# Patient Record
Sex: FEMALE | Race: OTHER | HISPANIC OR LATINO
[De-identification: names, ages, dates, MRNs, and addresses within clinical notes are randomized per-mention and may not be internally consistent; named-entity substitution may affect disease eponyms.]

---

## 2021-12-15 PROBLEM — Z00.00 ENCOUNTER FOR PREVENTIVE HEALTH EXAMINATION: Status: ACTIVE | Noted: 2021-12-15

## 2021-12-16 ENCOUNTER — RESULT REVIEW (OUTPATIENT)
Age: 56
End: 2021-12-16

## 2021-12-16 ENCOUNTER — APPOINTMENT (OUTPATIENT)
Dept: SURGICAL ONCOLOGY | Facility: CLINIC | Age: 56
End: 2021-12-16
Payer: COMMERCIAL

## 2021-12-16 PROCEDURE — 19083 BX BREAST 1ST LESION US IMAG: CPT

## 2021-12-16 PROCEDURE — 99205 OFFICE O/P NEW HI 60 MIN: CPT | Mod: 25

## 2022-03-14 ENCOUNTER — NON-APPOINTMENT (OUTPATIENT)
Age: 57
End: 2022-03-14

## 2022-03-22 ENCOUNTER — APPOINTMENT (OUTPATIENT)
Dept: SURGICAL ONCOLOGY | Facility: AMBULATORY MEDICAL SERVICES | Age: 57
End: 2022-03-22
Payer: COMMERCIAL

## 2022-03-22 PROCEDURE — 14000 TIS TRNFR TRUNK 10 SQ CM/<: CPT

## 2022-03-22 PROCEDURE — 19301 PARTIAL MASTECTOMY: CPT

## 2022-04-14 ENCOUNTER — APPOINTMENT (OUTPATIENT)
Dept: SURGICAL ONCOLOGY | Facility: CLINIC | Age: 57
End: 2022-04-14
Payer: COMMERCIAL

## 2022-04-14 PROCEDURE — 99024 POSTOP FOLLOW-UP VISIT: CPT

## 2022-12-15 ENCOUNTER — APPOINTMENT (OUTPATIENT)
Dept: SURGICAL ONCOLOGY | Facility: CLINIC | Age: 57
End: 2022-12-15

## 2023-09-25 ENCOUNTER — INPATIENT (INPATIENT)
Facility: HOSPITAL | Age: 58
LOS: 6 days | Discharge: HOME | DRG: 720 | End: 2023-10-02
Attending: STUDENT IN AN ORGANIZED HEALTH CARE EDUCATION/TRAINING PROGRAM | Admitting: INTERNAL MEDICINE
Payer: MEDICAID

## 2023-09-25 VITALS
OXYGEN SATURATION: 99 % | SYSTOLIC BLOOD PRESSURE: 138 MMHG | WEIGHT: 119.93 LBS | DIASTOLIC BLOOD PRESSURE: 70 MMHG | HEART RATE: 118 BPM | TEMPERATURE: 101 F | RESPIRATION RATE: 18 BRPM

## 2023-09-25 DIAGNOSIS — N12 TUBULO-INTERSTITIAL NEPHRITIS, NOT SPECIFIED AS ACUTE OR CHRONIC: ICD-10-CM

## 2023-09-25 LAB
ALBUMIN SERPL ELPH-MCNC: 4 G/DL — SIGNIFICANT CHANGE UP (ref 3.5–5.2)
ALP SERPL-CCNC: 104 U/L — SIGNIFICANT CHANGE UP (ref 30–115)
ALT FLD-CCNC: 62 U/L — HIGH (ref 0–41)
ANION GAP SERPL CALC-SCNC: 15 MMOL/L — HIGH (ref 7–14)
APPEARANCE UR: ABNORMAL
APTT BLD: 22.5 SEC — CRITICAL LOW (ref 27–39.2)
AST SERPL-CCNC: 37 U/L — SIGNIFICANT CHANGE UP (ref 0–41)
BASOPHILS # BLD AUTO: 0.03 K/UL — SIGNIFICANT CHANGE UP (ref 0–0.2)
BASOPHILS NFR BLD AUTO: 0.2 % — SIGNIFICANT CHANGE UP (ref 0–1)
BILIRUB SERPL-MCNC: 0.3 MG/DL — SIGNIFICANT CHANGE UP (ref 0.2–1.2)
BILIRUB UR-MCNC: NEGATIVE — SIGNIFICANT CHANGE UP
BUN SERPL-MCNC: 6 MG/DL — LOW (ref 10–20)
CALCIUM SERPL-MCNC: 9.3 MG/DL — SIGNIFICANT CHANGE UP (ref 8.4–10.5)
CHLORIDE SERPL-SCNC: 93 MMOL/L — LOW (ref 98–110)
CO2 SERPL-SCNC: 24 MMOL/L — SIGNIFICANT CHANGE UP (ref 17–32)
COLOR SPEC: SIGNIFICANT CHANGE UP
CREAT SERPL-MCNC: 0.6 MG/DL — LOW (ref 0.7–1.5)
DIFF PNL FLD: ABNORMAL
EGFR: 105 ML/MIN/1.73M2 — SIGNIFICANT CHANGE UP
EOSINOPHIL # BLD AUTO: 0 K/UL — SIGNIFICANT CHANGE UP (ref 0–0.7)
EOSINOPHIL NFR BLD AUTO: 0 % — SIGNIFICANT CHANGE UP (ref 0–8)
GLUCOSE SERPL-MCNC: 103 MG/DL — HIGH (ref 70–99)
GLUCOSE UR QL: NEGATIVE MG/DL — SIGNIFICANT CHANGE UP
HCT VFR BLD CALC: 39.7 % — SIGNIFICANT CHANGE UP (ref 37–47)
HGB BLD-MCNC: 13.9 G/DL — SIGNIFICANT CHANGE UP (ref 12–16)
IMM GRANULOCYTES NFR BLD AUTO: 0.3 % — SIGNIFICANT CHANGE UP (ref 0.1–0.3)
INR BLD: 1.13 RATIO — SIGNIFICANT CHANGE UP (ref 0.65–1.3)
KETONES UR-MCNC: ABNORMAL MG/DL
LEUKOCYTE ESTERASE UR-ACNC: ABNORMAL
LIDOCAIN IGE QN: 22 U/L — SIGNIFICANT CHANGE UP (ref 7–60)
LYMPHOCYTES # BLD AUTO: 1.45 K/UL — SIGNIFICANT CHANGE UP (ref 1.2–3.4)
LYMPHOCYTES # BLD AUTO: 11.8 % — LOW (ref 20.5–51.1)
MCHC RBC-ENTMCNC: 30.3 PG — SIGNIFICANT CHANGE UP (ref 27–31)
MCHC RBC-ENTMCNC: 35 G/DL — SIGNIFICANT CHANGE UP (ref 32–37)
MCV RBC AUTO: 86.5 FL — SIGNIFICANT CHANGE UP (ref 81–99)
MONOCYTES # BLD AUTO: 1.65 K/UL — HIGH (ref 0.1–0.6)
MONOCYTES NFR BLD AUTO: 13.4 % — HIGH (ref 1.7–9.3)
NEUTROPHILS # BLD AUTO: 9.17 K/UL — HIGH (ref 1.4–6.5)
NEUTROPHILS NFR BLD AUTO: 74.3 % — SIGNIFICANT CHANGE UP (ref 42.2–75.2)
NITRITE UR-MCNC: POSITIVE
NRBC # BLD: 0 /100 WBCS — SIGNIFICANT CHANGE UP (ref 0–0)
PH UR: 6 — SIGNIFICANT CHANGE UP (ref 5–8)
PLATELET # BLD AUTO: 261 K/UL — SIGNIFICANT CHANGE UP (ref 130–400)
PMV BLD: 12.2 FL — HIGH (ref 7.4–10.4)
POTASSIUM SERPL-MCNC: 3.8 MMOL/L — SIGNIFICANT CHANGE UP (ref 3.5–5)
POTASSIUM SERPL-SCNC: 3.8 MMOL/L — SIGNIFICANT CHANGE UP (ref 3.5–5)
PROT SERPL-MCNC: 6.9 G/DL — SIGNIFICANT CHANGE UP (ref 6–8)
PROT UR-MCNC: 100 MG/DL
PROTHROM AB SERPL-ACNC: 12.9 SEC — HIGH (ref 9.95–12.87)
RBC # BLD: 4.59 M/UL — SIGNIFICANT CHANGE UP (ref 4.2–5.4)
RBC # FLD: 12.5 % — SIGNIFICANT CHANGE UP (ref 11.5–14.5)
SODIUM SERPL-SCNC: 132 MMOL/L — LOW (ref 135–146)
SP GR SPEC: 1.02 — SIGNIFICANT CHANGE UP (ref 1–1.03)
UROBILINOGEN FLD QL: 1 MG/DL — SIGNIFICANT CHANGE UP (ref 0.2–1)
WBC # BLD: 12.34 K/UL — HIGH (ref 4.8–10.8)
WBC # FLD AUTO: 12.34 K/UL — HIGH (ref 4.8–10.8)

## 2023-09-25 PROCEDURE — 93005 ELECTROCARDIOGRAM TRACING: CPT

## 2023-09-25 PROCEDURE — 83735 ASSAY OF MAGNESIUM: CPT

## 2023-09-25 PROCEDURE — 82962 GLUCOSE BLOOD TEST: CPT

## 2023-09-25 PROCEDURE — 82550 ASSAY OF CK (CPK): CPT

## 2023-09-25 PROCEDURE — 80053 COMPREHEN METABOLIC PANEL: CPT

## 2023-09-25 PROCEDURE — 83605 ASSAY OF LACTIC ACID: CPT

## 2023-09-25 PROCEDURE — 74177 CT ABD & PELVIS W/CONTRAST: CPT | Mod: 26,MA

## 2023-09-25 PROCEDURE — 36415 COLL VENOUS BLD VENIPUNCTURE: CPT

## 2023-09-25 PROCEDURE — 80048 BASIC METABOLIC PNL TOTAL CA: CPT

## 2023-09-25 PROCEDURE — 76770 US EXAM ABDO BACK WALL COMP: CPT

## 2023-09-25 PROCEDURE — 82553 CREATINE MB FRACTION: CPT

## 2023-09-25 PROCEDURE — 99285 EMERGENCY DEPT VISIT HI MDM: CPT

## 2023-09-25 PROCEDURE — 85025 COMPLETE CBC W/AUTO DIFF WBC: CPT

## 2023-09-25 PROCEDURE — 74177 CT ABD & PELVIS W/CONTRAST: CPT

## 2023-09-25 PROCEDURE — 99221 1ST HOSP IP/OBS SF/LOW 40: CPT

## 2023-09-25 PROCEDURE — 84484 ASSAY OF TROPONIN QUANT: CPT

## 2023-09-25 RX ORDER — SODIUM CHLORIDE 9 MG/ML
1000 INJECTION, SOLUTION INTRAVENOUS ONCE
Refills: 0 | Status: COMPLETED | OUTPATIENT
Start: 2023-09-25 | End: 2023-09-25

## 2023-09-25 RX ORDER — ONDANSETRON 8 MG/1
4 TABLET, FILM COATED ORAL EVERY 8 HOURS
Refills: 0 | Status: DISCONTINUED | OUTPATIENT
Start: 2023-09-25 | End: 2023-10-02

## 2023-09-25 RX ORDER — KETOROLAC TROMETHAMINE 30 MG/ML
30 SYRINGE (ML) INJECTION ONCE
Refills: 0 | Status: DISCONTINUED | OUTPATIENT
Start: 2023-09-25 | End: 2023-09-25

## 2023-09-25 RX ORDER — SODIUM CHLORIDE 9 MG/ML
500 INJECTION, SOLUTION INTRAVENOUS ONCE
Refills: 0 | Status: COMPLETED | OUTPATIENT
Start: 2023-09-25 | End: 2023-09-25

## 2023-09-25 RX ORDER — ZOLPIDEM TARTRATE 10 MG/1
1 TABLET ORAL
Refills: 0 | DISCHARGE

## 2023-09-25 RX ORDER — CLONAZEPAM 1 MG
1 TABLET ORAL
Refills: 0 | DISCHARGE

## 2023-09-25 RX ORDER — ZOLPIDEM TARTRATE 10 MG/1
5 TABLET ORAL AT BEDTIME
Refills: 0 | Status: DISCONTINUED | OUTPATIENT
Start: 2023-09-25 | End: 2023-10-01

## 2023-09-25 RX ORDER — CEFTRIAXONE 500 MG/1
1000 INJECTION, POWDER, FOR SOLUTION INTRAMUSCULAR; INTRAVENOUS EVERY 24 HOURS
Refills: 0 | Status: DISCONTINUED | OUTPATIENT
Start: 2023-09-25 | End: 2023-09-26

## 2023-09-25 RX ORDER — ACETAMINOPHEN 500 MG
650 TABLET ORAL EVERY 6 HOURS
Refills: 0 | Status: DISCONTINUED | OUTPATIENT
Start: 2023-09-25 | End: 2023-10-02

## 2023-09-25 RX ORDER — CLONAZEPAM 1 MG
0.5 TABLET ORAL DAILY
Refills: 0 | Status: DISCONTINUED | OUTPATIENT
Start: 2023-09-25 | End: 2023-09-29

## 2023-09-25 RX ORDER — BUPROPION HYDROCHLORIDE 150 MG/1
1 TABLET, EXTENDED RELEASE ORAL
Refills: 0 | DISCHARGE

## 2023-09-25 RX ORDER — CEFTRIAXONE 500 MG/1
1000 INJECTION, POWDER, FOR SOLUTION INTRAMUSCULAR; INTRAVENOUS ONCE
Refills: 0 | Status: COMPLETED | OUTPATIENT
Start: 2023-09-25 | End: 2023-09-25

## 2023-09-25 RX ORDER — SODIUM CHLORIDE 9 MG/ML
1000 INJECTION INTRAMUSCULAR; INTRAVENOUS; SUBCUTANEOUS
Refills: 0 | Status: DISCONTINUED | OUTPATIENT
Start: 2023-09-25 | End: 2023-10-02

## 2023-09-25 RX ORDER — ONDANSETRON 8 MG/1
4 TABLET, FILM COATED ORAL ONCE
Refills: 0 | Status: COMPLETED | OUTPATIENT
Start: 2023-09-25 | End: 2023-09-25

## 2023-09-25 RX ORDER — MORPHINE SULFATE 50 MG/1
4 CAPSULE, EXTENDED RELEASE ORAL ONCE
Refills: 0 | Status: DISCONTINUED | OUTPATIENT
Start: 2023-09-25 | End: 2023-09-25

## 2023-09-25 RX ORDER — LAMOTRIGINE 25 MG/1
1 TABLET, ORALLY DISINTEGRATING ORAL
Refills: 0 | DISCHARGE

## 2023-09-25 RX ORDER — LANOLIN ALCOHOL/MO/W.PET/CERES
5 CREAM (GRAM) TOPICAL AT BEDTIME
Refills: 0 | Status: DISCONTINUED | OUTPATIENT
Start: 2023-09-25 | End: 2023-10-02

## 2023-09-25 RX ORDER — BUPROPION HYDROCHLORIDE 150 MG/1
150 TABLET, EXTENDED RELEASE ORAL DAILY
Refills: 0 | Status: DISCONTINUED | OUTPATIENT
Start: 2023-09-25 | End: 2023-10-02

## 2023-09-25 RX ORDER — LAMOTRIGINE 25 MG/1
100 TABLET, ORALLY DISINTEGRATING ORAL DAILY
Refills: 0 | Status: DISCONTINUED | OUTPATIENT
Start: 2023-09-25 | End: 2023-09-30

## 2023-09-25 RX ORDER — MORPHINE SULFATE 50 MG/1
4 CAPSULE, EXTENDED RELEASE ORAL EVERY 4 HOURS
Refills: 0 | Status: DISCONTINUED | OUTPATIENT
Start: 2023-09-25 | End: 2023-09-30

## 2023-09-25 RX ADMIN — ONDANSETRON 4 MILLIGRAM(S): 8 TABLET, FILM COATED ORAL at 14:49

## 2023-09-25 RX ADMIN — MORPHINE SULFATE 4 MILLIGRAM(S): 50 CAPSULE, EXTENDED RELEASE ORAL at 16:43

## 2023-09-25 RX ADMIN — CEFTRIAXONE 100 MILLIGRAM(S): 500 INJECTION, POWDER, FOR SOLUTION INTRAMUSCULAR; INTRAVENOUS at 16:43

## 2023-09-25 RX ADMIN — SODIUM CHLORIDE 500 MILLILITER(S): 9 INJECTION, SOLUTION INTRAVENOUS at 16:44

## 2023-09-25 RX ADMIN — Medication 650 MILLIGRAM(S): at 20:33

## 2023-09-25 RX ADMIN — SODIUM CHLORIDE 1000 MILLILITER(S): 9 INJECTION, SOLUTION INTRAVENOUS at 14:48

## 2023-09-25 NOTE — ED ADULT NURSE NOTE - OBJECTIVE STATEMENT
pt complaining of abdominal pain and vomiting for 5 days   "I think its a kidney stone. I have a history of them"

## 2023-09-25 NOTE — ED PROVIDER NOTE - OBJECTIVE STATEMENT
Patient is a 58 year old female with pmhx of pseudoxanthoma Elasticum, blindness presents for evaluation of left sided flank pain, N/V, dysuria, urgency, frequency for 5 days. She admits to history of kidney stones and "kidney infections". She denies any noted fever at home but admits to subjective chills. She denies any cough, sore throat, chest pain, sob, or other complaints at this time.

## 2023-09-25 NOTE — ED ADULT NURSE NOTE - NSFALLUNIVINTERV_ED_ALL_ED
Bed/Stretcher in lowest position, wheels locked, appropriate side rails in place/Call bell, personal items and telephone in reach/Instruct patient to call for assistance before getting out of bed/chair/stretcher/Non-slip footwear applied when patient is off stretcher/[place] to call system/Physically safe environment - no spills, clutter or unnecessary equipment/Purposeful proactive rounding/Room/bathroom lighting operational, light cord in reach

## 2023-09-25 NOTE — ED ADULT NURSE NOTE - NSICDXPASTMEDICALHX_GEN_ALL_CORE_FT
PAST MEDICAL HISTORY:  H/O blindness     PAD (peripheral artery disease)     Pseudoxanthoma elasticum-like papillary dermal elastolysis

## 2023-09-25 NOTE — H&P ADULT - NS ATTEND AMEND GEN_ALL_CORE FT
Seen while in the ER, significant other at bedside - agree with assessment and plan as outlined Seen while in the ER, significant other at bedside - agree with assessment (sepsis on admission - fever, tachycardia, leukocytosis. Also has mild hyponatremia) and plan as outlined Seen while in the ER, significant other at bedside - agree with assessment (sepsis on admission - fever, tachycardia, leukocytosis. Also has mild hyponatremia, mild transaminitis and mild elevated anion gap without acidosis) and plan as outlined

## 2023-09-25 NOTE — PATIENT PROFILE ADULT - FALL HARM RISK - HARM RISK INTERVENTIONS

## 2023-09-25 NOTE — ED PROVIDER NOTE - ATTENDING APP SHARED VISIT CONTRIBUTION OF CARE
58-year-old female presenting today with abdominal pain, diarrhea, flank pain.  Patient has history of kidney stones.  Patient is given medications for UTI.  CT scan is pending and patient is signed out pending CT scan results and disposition

## 2023-09-25 NOTE — ED PROVIDER NOTE - NS ED ATTENDING STATEMENT MOD
This was a shared visit with the JACKLYN. I reviewed and verified the documentation and independently performed the documented:

## 2023-09-25 NOTE — H&P ADULT - HISTORY OF PRESENT ILLNESS
59 y/o 57 y/o female pmhx, PXE, Blindness, presents with c/o lower abdomen pain, radiating across bilateral lower abdomen.  Pt states symptoms started a week ago with Pain, Dysuria, Frequency, malodorous urine, chills and vomiting.  Pt states started taking Tylenol with little improvement.  Pt denies recent UTI treatment.    Home meds obtained from pt.

## 2023-09-25 NOTE — ED PROVIDER NOTE - PHYSICAL EXAMINATION
As Follows:  CONST: Appears in uncomfortable in stretcher.   ENT: No nasal discharge. Oropharynx normal appearing, no erythema or exudates. Uvula midline  CARD: No murmurs, rubs, or gallops; Tachycardic.   RESP: BS Equal B/L, No wheezes, rhonchi or rales. No distress or accessory breathing  GI: Suprapubic tenderness. Soft, non-distended. Left sided CVA tenderness.   SKIN: Warm, dry, no acute rashes. MMM  NEURO: A&Ox3, No focal deficits. Strength and sensation intact. Steady gait

## 2023-09-25 NOTE — H&P ADULT - ASSESSMENT
59 y/o female pmhx, PXE, Blindness, presents with c/o lower abdomen pain, radiating across bilateral lower abdomen.  Pt states symptoms started a week ago with Pain, Dysuria, Frequency, malodorous urine, chills and vomiting.  Pt states started taking Tylenol with little improvement.  Pt denies recent UTI treatment.      # Pyelonephritis  - Nephro consult  - trend labs  - IVF  - rocephin 1gram daily  - pain management    # Blindness  - Full assistance  - RTF     59 y/o female pmhx, PXE, Blindness, presents with c/o lower abdomen pain, radiating across bilateral lower abdomen.  Pt states symptoms started a week ago with Pain, Dysuria, Frequency, malodorous urine, chills and vomiting.  Pt states started taking Tylenol with little improvement.  Pt denies recent UTI treatment.      # Pyelonephritis  - ID consult  - trend labs  - IVF  - rocephin 1gram daily  - pain management    # Blindness  - Full assistance  - RTF

## 2023-09-26 LAB
ALBUMIN SERPL ELPH-MCNC: 3.4 G/DL — LOW (ref 3.5–5.2)
ALP SERPL-CCNC: 94 U/L — SIGNIFICANT CHANGE UP (ref 30–115)
ALT FLD-CCNC: 54 U/L — HIGH (ref 0–41)
ANION GAP SERPL CALC-SCNC: 10 MMOL/L — SIGNIFICANT CHANGE UP (ref 7–14)
AST SERPL-CCNC: 30 U/L — SIGNIFICANT CHANGE UP (ref 0–41)
BASOPHILS # BLD AUTO: 0.05 K/UL — SIGNIFICANT CHANGE UP (ref 0–0.2)
BASOPHILS NFR BLD AUTO: 0.5 % — SIGNIFICANT CHANGE UP (ref 0–1)
BILIRUB SERPL-MCNC: 0.3 MG/DL — SIGNIFICANT CHANGE UP (ref 0.2–1.2)
BUN SERPL-MCNC: 5 MG/DL — LOW (ref 10–20)
CALCIUM SERPL-MCNC: 9 MG/DL — SIGNIFICANT CHANGE UP (ref 8.4–10.5)
CHLORIDE SERPL-SCNC: 99 MMOL/L — SIGNIFICANT CHANGE UP (ref 98–110)
CO2 SERPL-SCNC: 29 MMOL/L — SIGNIFICANT CHANGE UP (ref 17–32)
CREAT SERPL-MCNC: 0.5 MG/DL — LOW (ref 0.7–1.5)
EGFR: 109 ML/MIN/1.73M2 — SIGNIFICANT CHANGE UP
EOSINOPHIL # BLD AUTO: 0.07 K/UL — SIGNIFICANT CHANGE UP (ref 0–0.7)
EOSINOPHIL NFR BLD AUTO: 0.6 % — SIGNIFICANT CHANGE UP (ref 0–8)
GLUCOSE SERPL-MCNC: 95 MG/DL — SIGNIFICANT CHANGE UP (ref 70–99)
HCT VFR BLD CALC: 35.6 % — LOW (ref 37–47)
HGB BLD-MCNC: 12.2 G/DL — SIGNIFICANT CHANGE UP (ref 12–16)
IMM GRANULOCYTES NFR BLD AUTO: 0.4 % — HIGH (ref 0.1–0.3)
LACTATE SERPL-SCNC: 0.8 MMOL/L — SIGNIFICANT CHANGE UP (ref 0.7–2)
LYMPHOCYTES # BLD AUTO: 1.25 K/UL — SIGNIFICANT CHANGE UP (ref 1.2–3.4)
LYMPHOCYTES # BLD AUTO: 11.5 % — LOW (ref 20.5–51.1)
MCHC RBC-ENTMCNC: 29.8 PG — SIGNIFICANT CHANGE UP (ref 27–31)
MCHC RBC-ENTMCNC: 34.3 G/DL — SIGNIFICANT CHANGE UP (ref 32–37)
MCV RBC AUTO: 87 FL — SIGNIFICANT CHANGE UP (ref 81–99)
MONOCYTES # BLD AUTO: 1.46 K/UL — HIGH (ref 0.1–0.6)
MONOCYTES NFR BLD AUTO: 13.5 % — HIGH (ref 1.7–9.3)
NEUTROPHILS # BLD AUTO: 7.96 K/UL — HIGH (ref 1.4–6.5)
NEUTROPHILS NFR BLD AUTO: 73.5 % — SIGNIFICANT CHANGE UP (ref 42.2–75.2)
NRBC # BLD: 0 /100 WBCS — SIGNIFICANT CHANGE UP (ref 0–0)
PLATELET # BLD AUTO: 254 K/UL — SIGNIFICANT CHANGE UP (ref 130–400)
PMV BLD: 12.5 FL — HIGH (ref 7.4–10.4)
POTASSIUM SERPL-MCNC: 3.3 MMOL/L — LOW (ref 3.5–5)
POTASSIUM SERPL-SCNC: 3.3 MMOL/L — LOW (ref 3.5–5)
PROT SERPL-MCNC: 5.9 G/DL — LOW (ref 6–8)
RBC # BLD: 4.09 M/UL — LOW (ref 4.2–5.4)
RBC # FLD: 12.6 % — SIGNIFICANT CHANGE UP (ref 11.5–14.5)
SODIUM SERPL-SCNC: 138 MMOL/L — SIGNIFICANT CHANGE UP (ref 135–146)
WBC # BLD: 10.83 K/UL — HIGH (ref 4.8–10.8)
WBC # FLD AUTO: 10.83 K/UL — HIGH (ref 4.8–10.8)

## 2023-09-26 PROCEDURE — 99233 SBSQ HOSP IP/OBS HIGH 50: CPT

## 2023-09-26 RX ORDER — CEFEPIME 1 G/1
1000 INJECTION, POWDER, FOR SOLUTION INTRAMUSCULAR; INTRAVENOUS EVERY 8 HOURS
Refills: 0 | Status: DISCONTINUED | OUTPATIENT
Start: 2023-09-26 | End: 2023-09-28

## 2023-09-26 RX ORDER — CEFEPIME 1 G/1
1000 INJECTION, POWDER, FOR SOLUTION INTRAMUSCULAR; INTRAVENOUS EVERY 8 HOURS
Refills: 0 | Status: DISCONTINUED | OUTPATIENT
Start: 2023-09-26 | End: 2023-09-27

## 2023-09-26 RX ORDER — POTASSIUM CHLORIDE 20 MEQ
20 PACKET (EA) ORAL
Refills: 0 | Status: COMPLETED | OUTPATIENT
Start: 2023-09-26 | End: 2023-09-26

## 2023-09-26 RX ADMIN — CEFEPIME 100 MILLIGRAM(S): 1 INJECTION, POWDER, FOR SOLUTION INTRAMUSCULAR; INTRAVENOUS at 14:29

## 2023-09-26 RX ADMIN — LAMOTRIGINE 100 MILLIGRAM(S): 25 TABLET, ORALLY DISINTEGRATING ORAL at 22:41

## 2023-09-26 RX ADMIN — SODIUM CHLORIDE 100 MILLILITER(S): 9 INJECTION INTRAMUSCULAR; INTRAVENOUS; SUBCUTANEOUS at 14:29

## 2023-09-26 RX ADMIN — SODIUM CHLORIDE 100 MILLILITER(S): 9 INJECTION INTRAMUSCULAR; INTRAVENOUS; SUBCUTANEOUS at 06:28

## 2023-09-26 RX ADMIN — Medication 20 MILLIEQUIVALENT(S): at 20:40

## 2023-09-26 RX ADMIN — Medication 20 MILLIEQUIVALENT(S): at 18:27

## 2023-09-26 RX ADMIN — Medication 20 MILLIEQUIVALENT(S): at 16:30

## 2023-09-26 RX ADMIN — Medication 650 MILLIGRAM(S): at 12:34

## 2023-09-26 RX ADMIN — ZOLPIDEM TARTRATE 5 MILLIGRAM(S): 10 TABLET ORAL at 22:53

## 2023-09-26 RX ADMIN — Medication 650 MILLIGRAM(S): at 12:04

## 2023-09-26 RX ADMIN — CEFEPIME 100 MILLIGRAM(S): 1 INJECTION, POWDER, FOR SOLUTION INTRAMUSCULAR; INTRAVENOUS at 22:53

## 2023-09-26 NOTE — DIETITIAN INITIAL EVALUATION ADULT - NUTRITIONGOAL OUTCOME1
pt to tolerate po diet and meet at least 50-75% of estimated nutrient needs within 3-5 days lethargic

## 2023-09-26 NOTE — DIETITIAN INITIAL EVALUATION ADULT - OTHER INFO
pt is 58 year old female with hx of PXE, blindness, presents with lower abd pain radiated to B/L abd which began 1 week ago. pt admitted with pyelonephritis and severe sepsis. Hypokalemia 2/2 LBM's noted

## 2023-09-26 NOTE — CONSULT NOTE ADULT - SUBJECTIVE AND OBJECTIVE BOX
RICHY STERLING  58y, Female  Allergies    Toradol (Vomiting)    Intolerances        LOS  1d    HPI  HPI:  59 y/o female pmhx, PXE, Blindness, presents with c/o lower abdomen pain, radiating across bilateral lower abdomen.  Pt states symptoms started a week ago with Pain, Dysuria, Frequency, malodorous urine, chills and vomiting.  Pt states started taking Tylenol with little improvement.  Pt denies recent UTI treatment.    Home meds obtained from pt.   (25 Sep 2023 19:51)      INFECTIOUS DISEASE HISTORY:  ID is being consulted for antimicrobial recommendations.     Prior hospital charts reviewed [Yes]  Primary team notes reviewed [Yes]  Other consultant notes reviewed [Yes]    REVIEW OF SYSTEMS:  CONSTITUTIONAL: No fever or chills  HEENT: No sore throat  RESPIRATORY: No cough, no shortness of breath  CARDIOVASCULAR: No chest pain or palpitations  GASTROINTESTINAL: No abdominal or epigastric pain  GENITOURINARY: No dysuria  NEUROLOGICAL: No headache/dizziness  MSK: No joint pain, erythema, or swelling; no back pain  SKIN: No itching, rashes  All other ROS negative except noted above    PAST MEDICAL & SURGICAL HISTORY:  Pseudoxanthoma elasticum-like papillary dermal elastolysis      H/O blindness      PAD (peripheral artery disease)          SOCIAL HISTORY:  - Born in [place], migrated [place] in 19XX  - Currently working as / Retired  - Lives with _____; no pets  - No recent travel  - Denies tobacco use, alcohol use, illicit drug use  - Currently sexually active, has one male/female sexual partner    FAMILY HISTORY:      Allergy: 1d    ANTIMICROBIALS:  cefTRIAXone   IVPB 1000 every 24 hours      ANTIMICROBIALS (past 90 days):  MEDICATIONS  (STANDING):    cefTRIAXone   IVPB   100 mL/Hr IV Intermittent (09-25-23 @ 16:43)        OTHER MEDS:   MEDICATIONS  (STANDING):  acetaminophen     Tablet .. 650 every 6 hours PRN  aluminum hydroxide/magnesium hydroxide/simethicone Suspension 30 every 4 hours PRN  buPROPion XL (24-Hour) . 150 daily  clonazePAM  Tablet 0.5 daily PRN  lamoTRIgine 100 daily  melatonin 5 at bedtime PRN  morphine  - Injectable 4 every 4 hours PRN  ondansetron Injectable 4 every 8 hours PRN  zolpidem 5 at bedtime PRN  zolpidem 5 at bedtime PRN      VITALS:  Vital Signs Last 24 Hrs  T(F): 97.1 (09-26-23 @ 04:59), Max: 101.1 (09-25-23 @ 13:51)    Vital Signs Last 24 Hrs  HR: 92 (09-26-23 @ 04:59) (89 - 118)  BP: 158/67 (09-26-23 @ 04:59) (117/60 - 158/67)  RR: 18 (09-26-23 @ 04:59)  SpO2: 99% (09-26-23 @ 04:59) (99% - 99%)  Wt(kg): --    EXAM:  GENERAL: NAD, lying in bed  HEAD: No head lesions  NECK: Supple, nontender to palpation; no JVD  CHEST/LUNG: Clear to auscultation bilaterally  HEART: S1 S2  ABDOMEN: Soft, nontender, nondistended; normoactive bowel sounds  EXTREMITIES: No clubbing, cyanosis, or petal edema  NERVOUS SYSTEM: Alert and oriented to person, time, place and situation, speech clear. No focal deficits   MSK: No joint erythema, swelling or pain  SKIN: No rashes or lesions, no superficial thrombophlebitis    Labs:                        12.2   10.83 )-----------( 254      ( 26 Sep 2023 05:59 )             35.6     09-26    138  |  99  |  5<L>  ----------------------------<  95  3.3<L>   |  29  |  0.5<L>    Ca    9.0      26 Sep 2023 05:59    TPro  5.9<L>  /  Alb  3.4<L>  /  TBili  0.3  /  DBili  x   /  AST  30  /  ALT  54<H>  /  AlkPhos  94  09-26      WBC Trend:  WBC Count: 10.83 (09-26-23 @ 05:59)  WBC Count: 12.34 (09-25-23 @ 14:07)      Auto Neutrophil #: 7.96 K/uL (09-26-23 @ 05:59)  Auto Neutrophil #: 9.17 K/uL (09-25-23 @ 14:07)      Creatine Trend:  Creatinine: 0.5 (09-26)  Creatinine: 0.6 (09-25)      Liver Biochemical Testing Trend:  Alanine Aminotransferase (ALT/SGPT): 54 *H* (09-26)  Alanine Aminotransferase (ALT/SGPT): 62 *H* (09-25)  Aspartate Aminotransferase (AST/SGOT): 30 (09-26-23 @ 05:59)  Aspartate Aminotransferase (AST/SGOT): 37 (09-25-23 @ 14:07)  Bilirubin Total: 0.3 (09-26)  Bilirubin Total: 0.3 (09-25)      Trend LDH      Auto Eosinophil %: 0.6 % (09-26-23 @ 05:59)  Auto Eosinophil %: 0.0 % (09-25-23 @ 14:07)      Urinalysis Basic - ( 26 Sep 2023 05:59 )    Color: x / Appearance: x / SG: x / pH: x  Gluc: 95 mg/dL / Ketone: x  / Bili: x / Urobili: x   Blood: x / Protein: x / Nitrite: x   Leuk Esterase: x / RBC: x / WBC x   Sq Epi: x / Non Sq Epi: x / Bacteria: x        MICROBIOLOGY:    Female                                                  Lactate, Blood: 0.8 (09-26 @ 05:59)        INFLAMMATORY MARKERS      RADIOLOGY & ADDITIONAL TESTS:  I have personally reviewed the imagings.  CXR      CT  CT Abdomen and Pelvis w/ IV Cont:   ACC: [id number] EXAM:  CT ABDOMEN AND PELVIS IC   ORDERED BY: RHIANNON DEL CID     PROCEDURE DATE:  09/25/2023          INTERPRETATION:  CLINICAL STATEMENT: Left-sided abdominal pain to flank   pain    TECHNIQUE: Contiguous axial CT images were obtained from the lower chest   to the pubic symphysis following administration of intravenous contrast.    90 cc administered of Omnipaque 300 (10 cc discarded).  Oral contrast was   not administered.  Reformatted images in the coronal and sagittal planes   were acquired.    COMPARISON CT: None.    OTHER STUDIES USED FOR CORRELATION: None.      FINDINGS:    LOWER CHEST: Unremarkable.    HEPATOBILIARY: Left hepatic 1.4 cm cyst.    SPLEEN: Unremarkable.    PANCREAS: Unremarkable.    ADRENAL GLANDS: Unremarkable.    KIDNEYS: There are striated nephrogram of the bilateral kidneys,   consistent with pyelonephritis. Too small to characterize hypodensities   are seen in the bilateral kidneys. Mild left hydroureter with enhancement   of the urothelial wall, consistent with ascending infection.    ABDOMINOPELVIC NODES: Unremarkable.    PELVIC ORGANS: Unremarkable.    PERITONEUM/MESENTERY/BOWEL: No bowel obstruction, pneumoperitoneum or   ascites. A normal appendix is seen in the right lower quadrant.    BONES/SOFT TISSUES: Degenerative changes of the spine.    OTHER: Mild atherosclerotic calcifications of the aorta.      IMPRESSION: Striated nephrogram of the bilateral kidneys consistent with   pyelonephritis.    Mild left hydroureter with enhancement of the urothelial wall, consistent   with ascending infection.    --- End of Report ---            JUSTINE HA MD; Attending Radiologist  This document has been electronically signed. Sep 25 2023  5:50PM (09-25-23 @ 15:48)      CARDIOLOGY TESTING             ASHER RICHY  58y, Female  Allergies    Toradol (Vomiting)    Intolerances      LOS  1d    HPI  HPI:  59 y/o female pmhx, PXE, Blindness, presents with c/o lower abdomen pain, radiating across bilateral lower abdomen.  Pt states symptoms started a week ago with Pain, Dysuria, Frequency, malodorous urine, chills and vomiting.  Pt states started taking Tylenol with little improvement.  Pt denies recent UTI treatment.    Home meds obtained from pt.   (25 Sep 2023 19:51)      INFECTIOUS DISEASE HISTORY:  ID is being consulted for antimicrobial recommendations. Currently mentions that she was holding her bladder last night due to delay in using the rest room. Now feels a lot of burning and discomfort in the bladder. Denies fevers, but positive for chills. Has had multiple infections UTIs in the past. Last time was treated for UTI in April with cipro likely. As has history of passing renal stones.     Prior hospital charts reviewed [Yes]  Primary team notes reviewed [Yes]  Other consultant notes reviewed [Yes]    REVIEW OF SYSTEMS:  CONSTITUTIONAL: No fever.  HEENT: No sore throat  RESPIRATORY: No cough, no shortness of breath  CARDIOVASCULAR: No chest pain or palpitations  GASTROINTESTINAL: No abdominal or epigastric pain  GENITOURINARY: No dysuria  NEUROLOGICAL: No headache/dizziness  MSK: No joint pain, erythema, or swelling; no back pain  SKIN: No itching, rashes  All other ROS negative except noted above    PAST MEDICAL & SURGICAL HISTORY:  Pseudoxanthoma elasticum-like papillary dermal elastolysis      H/O blindness      PAD (peripheral artery disease)      SOCIAL HISTORY:  - No recent travel  - Denies tobacco use, alcohol use, illicit drug use  - Currently sexually active, has one male/female sexual partner    FAMILY HISTORY: pseudoxanthoma elasticum (brother)      ANTIMICROBIALS:  cefTRIAXone   IVPB 1000 every 24 hours      ANTIMICROBIALS (past 90 days):  MEDICATIONS  (STANDING):    cefTRIAXone   IVPB   100 mL/Hr IV Intermittent (09-25-23 @ 16:43)        OTHER MEDS:   MEDICATIONS  (STANDING):  acetaminophen     Tablet .. 650 every 6 hours PRN  aluminum hydroxide/magnesium hydroxide/simethicone Suspension 30 every 4 hours PRN  buPROPion XL (24-Hour) . 150 daily  clonazePAM  Tablet 0.5 daily PRN  lamoTRIgine 100 daily  melatonin 5 at bedtime PRN  morphine  - Injectable 4 every 4 hours PRN  ondansetron Injectable 4 every 8 hours PRN  zolpidem 5 at bedtime PRN  zolpidem 5 at bedtime PRN      VITALS:  Vital Signs Last 24 Hrs  T(F): 97.1 (09-26-23 @ 04:59), Max: 101.1 (09-25-23 @ 13:51)    Vital Signs Last 24 Hrs  HR: 92 (09-26-23 @ 04:59) (89 - 118)  BP: 158/67 (09-26-23 @ 04:59) (117/60 - 158/67)  RR: 18 (09-26-23 @ 04:59)  SpO2: 99% (09-26-23 @ 04:59) (99% - 99%)  Wt(kg): --    EXAM:  GENERAL: NAD, lying in bed, in mild distress  HEAD: No head lesions. Visual blindness.   NECK: Supple, nontender to palpation  CHEST/LUNG: Clear to auscultation bilaterally  HEART: S1 S2  ABDOMEN: Soft, supra-pubic tenderness noted. No CVA tenderness.   EXTREMITIES: No clubbing, cyanosis, or petal edema  NERVOUS SYSTEM: Alert and oriented to person, time, place and situation, speech clear  MSK: No joint erythema, swelling or pain  SKIN: No rashes or lesions, no superficial thrombophlebitis    Labs:                        12.2   10.83 )-----------( 254      ( 26 Sep 2023 05:59 )             35.6     09-26    138  |  99  |  5<L>  ----------------------------<  95  3.3<L>   |  29  |  0.5<L>    Ca    9.0      26 Sep 2023 05:59    TPro  5.9<L>  /  Alb  3.4<L>  /  TBili  0.3  /  DBili  x   /  AST  30  /  ALT  54<H>  /  AlkPhos  94  09-26      WBC Trend:  WBC Count: 10.83 (09-26-23 @ 05:59)  WBC Count: 12.34 (09-25-23 @ 14:07)      Auto Neutrophil #: 7.96 K/uL (09-26-23 @ 05:59)  Auto Neutrophil #: 9.17 K/uL (09-25-23 @ 14:07)      Creatine Trend:  Creatinine: 0.5 (09-26)  Creatinine: 0.6 (09-25)      Liver Biochemical Testing Trend:  Alanine Aminotransferase (ALT/SGPT): 54 *H* (09-26)  Alanine Aminotransferase (ALT/SGPT): 62 *H* (09-25)  Aspartate Aminotransferase (AST/SGOT): 30 (09-26-23 @ 05:59)  Aspartate Aminotransferase (AST/SGOT): 37 (09-25-23 @ 14:07)  Bilirubin Total: 0.3 (09-26)  Bilirubin Total: 0.3 (09-25)      Trend LDH      Auto Eosinophil %: 0.6 % (09-26-23 @ 05:59)  Auto Eosinophil %: 0.0 % (09-25-23 @ 14:07)      Urinalysis Basic - ( 26 Sep 2023 05:59 )    Color: x / Appearance: x / SG: x / pH: x  Gluc: 95 mg/dL / Ketone: x  / Bili: x / Urobili: x   Blood: x / Protein: x / Nitrite: x   Leuk Esterase: x / RBC: x / WBC x   Sq Epi: x / Non Sq Epi: x / Bacteria: x        MICROBIOLOGY:    Female      Lactate, Blood: 0.8 (09-26 @ 05:59)        INFLAMMATORY MARKERS      RADIOLOGY & ADDITIONAL TESTS:  I have personally reviewed the imagings.  CXR      CT  CT Abdomen and Pelvis w/ IV Cont:   ACC: [id number] EXAM:  CT ABDOMEN AND PELVIS IC   ORDERED BY: RHIANNON DEL CID     PROCEDURE DATE:  09/25/2023          INTERPRETATION:  CLINICAL STATEMENT: Left-sided abdominal pain to flank   pain    TECHNIQUE: Contiguous axial CT images were obtained from the lower chest   to the pubic symphysis following administration of intravenous contrast.    90 cc administered of Omnipaque 300 (10 cc discarded).  Oral contrast was   not administered.  Reformatted images in the coronal and sagittal planes   were acquired.    COMPARISON CT: None.    OTHER STUDIES USED FOR CORRELATION: None.      FINDINGS:    LOWER CHEST: Unremarkable.    HEPATOBILIARY: Left hepatic 1.4 cm cyst.    SPLEEN: Unremarkable.    PANCREAS: Unremarkable.    ADRENAL GLANDS: Unremarkable.    KIDNEYS: There are striated nephrogram of the bilateral kidneys,   consistent with pyelonephritis. Too small to characterize hypodensities   are seen in the bilateral kidneys. Mild left hydroureter with enhancement   of the urothelial wall, consistent with ascending infection.    ABDOMINOPELVIC NODES: Unremarkable.    PELVIC ORGANS: Unremarkable.    PERITONEUM/MESENTERY/BOWEL: No bowel obstruction, pneumoperitoneum or   ascites. A normal appendix is seen in the right lower quadrant.    BONES/SOFT TISSUES: Degenerative changes of the spine.    OTHER: Mild atherosclerotic calcifications of the aorta.      IMPRESSION: Striated nephrogram of the bilateral kidneys consistent with   pyelonephritis.    Mild left hydroureter with enhancement of the urothelial wall, consistent   with ascending infection.    --- End of Report ---            JUSTINE HA MD; Attending Radiologist  This document has been electronically signed. Sep 25 2023  5:50PM (09-25-23 @ 15:48)      CARDIOLOGY TESTING

## 2023-09-26 NOTE — DIETITIAN INITIAL EVALUATION ADULT - ADD RECOMMEND
-case reviewed with PA to r/o infectious etiologies for lbms  -RD to monitor tolerance to po diet, labs/meds, NFPF and f/u as needed within 3-5 days  high risk

## 2023-09-26 NOTE — PROGRESS NOTE ADULT - ASSESSMENT
59 y/o female pmhx, PXE, Blindness, presents with c/o lower abdomen pain, radiating across bilateral lower abdomen.  Pt states symptoms started a week ago with Pain, Dysuria, Frequency, malodorous urine, chills and vomiting.  Pt states started taking Tylenol with little improvement.  Pt denies recent UTI treatment.    # Pyelonephritis  # Left mild hydroureter  # Sepsis on admission  - Tmax 101  - CT Abdomen and Pelvis w/ IV Cont (09.25.23): striated nephrogram of the bilateral kidneys consistent with pyelonephritis. Mild left hydroureter with enhancement of the urothelial wall, consistent with ascending infection.  - start  IV cefepime 1 gram q 8 hours  - f/u with urine and blood cultures  - ID following     # Hypokalemia  - K replete     # Hx of Pseudoxanthoma elasticum  - c/w home klonopin and lamictal     # Depresision   - c/w welbrutrin    # DVT ppx 57 y/o female pmhx, PXE, Blindness, presents with c/o lower abdomen pain, radiating across bilateral lower abdomen.  Pt states symptoms started a week ago with Pain, Dysuria, Frequency, malodorous urine, chills and vomiting.  Pt states started taking Tylenol with little improvement.  Pt denies recent UTI treatment.    # Pyelonephritis  # Left mild hydroureter  # Sepsis on admission  - Tmax 101  - CT Abdomen and Pelvis w/ IV Cont (09.25.23): striated nephrogram of the bilateral kidneys consistent with pyelonephritis. Mild left hydroureter with enhancement of the urothelial wall, consistent with ascending infection.  - start  IV cefepime 1 gram q 8 hours  - f/u with urine and blood cultures  - ID following     # Hypokalemia  - K replete     # Hx of Pseudoxanthoma elasticum  - c/w home Klonopin and Lamictal     # Depression   - c/w Wellbutrin    # DVT ppx

## 2023-09-26 NOTE — DIETITIAN INITIAL EVALUATION ADULT - ORAL INTAKE PTA/DIET HISTORY
as per pt consumes a regular diet no meat (does consume fish, eggs and dairy) at home with good po intake. ~1 week PTA pt began having nausea/vomiting and diarrhea (post meals). last episode of diarrhea was today after lunch-informed PA. pt denies any recent abx use, NKFA or intolerances. pt believes this probably led to a weight loss of a few pounds    presently on a regular diet tolerating well consumed >50% of lunch.

## 2023-09-26 NOTE — DIETITIAN INITIAL EVALUATION ADULT - PERTINENT LABORATORY DATA
09-26    138  |  99  |  5<L>  ----------------------------<  95  3.3<L>   |  29  |  0.5<L>    Ca    9.0      26 Sep 2023 05:59    TPro  5.9<L>  /  Alb  3.4<L>  /  TBili  0.3  /  DBili  x   /  AST  30  /  ALT  54<H>  /  AlkPhos  94  09-26                          12.2   10.83 )-----------( 254      ( 26 Sep 2023 05:59 )             35.6

## 2023-09-26 NOTE — CONSULT NOTE ADULT - ASSESSMENT
ASSESSMENT  58y F admitted with Tubulointerstitial nephritis      Pseudoxanthoma elasticum-like papillary dermal elastolysis    H/O blindness    PAD (peripheral artery disease)        IMPRESSION  #  #Severe Sepsis on admission  #Lactic acidosis  #Hyponatremia   #Obesity BMI (kg/m2): 20.8  #DM     RECOMMENDATIONS  This is an incomplete consult note. All final recommendations to follow after interview and examination of the patient. Please follow recommendations noted below.    If any questions, please send a message or call on [employer] Teams  Please continue to update ID with any pertinent new laboratory or radiographic findings.    Asia Mccray M.D  Infectious Diseases Attending  [facility]    ASSESSMENT  59 y/o female pmhx of Pseudoxanthoma Elasticum, Central Blindness, PAD is presenting with c/o lower abdomen pain, radiating across bilateral lower abdomen.    IMPRESSION  #Pyelonephritis  #Left mild hydroureter  #Sepsis on admission    RECOMMENDATIONS  -Follow up with urine and blood cultures.  -Noted in history in [facility]- Enterobacter Aerogenes in the urine (2017) which is an Amp-C   -For now will transition to IV cefepime 1 gram q 8 hours.  -Avoid urinary retention.     If any questions, please send a message or call on [employer] Teams  Please continue to update ID with any pertinent new laboratory or radiographic findings.    Asia Mccray M.D  Infectious Diseases Attending  [facility]    ASSESSMENT  57 y/o female pmhx of Pseudoxanthoma Elasticum, Central Blindness, PAD is presenting with c/o lower abdomen pain, radiating across bilateral lower abdomen.    IMPRESSION  #Pyelonephritis  #Left mild hydroureter  #Sepsis on admission    RECOMMENDATIONS  -Follow up with urine and blood cultures.  -Noted in history in [facility]- Enterobacter Aerogenes in the urine (2017) which is an Amp-C   -For now will transition to IV cefepime 1 gram q 8 hours.  -Avoid urinary retention. Antibiotic duration and selection for PO based on urine culture data.     If any questions, please send a message or call on [employer] Teams  Please continue to update ID with any pertinent new laboratory or radiographic findings.    Asia Mccray M.D  Infectious Diseases Attending  [facility]

## 2023-09-26 NOTE — PROGRESS NOTE ADULT - SUBJECTIVE AND OBJECTIVE BOX
SUBJECTIVE:    Patient is a 58y old Female who presents with a chief complaint of Pyelonephritis (26 Sep 2023 09:09)    Currently admitted to medicine with the primary diagnosis of Pyelonephritis       Today is hospital day 1d. This morning she is resting comfortably in bed and reports no new issues or overnight events.     PAST MEDICAL & SURGICAL HISTORY  Pseudoxanthoma elasticum-like papillary dermal elastolysis    H/O blindness    PAD (peripheral artery disease)      SOCIAL HISTORY:  Negative for smoking/alcohol/drug use.     ALLERGIES:  Toradol (Vomiting)    MEDICATIONS:  STANDING MEDICATIONS  buPROPion XL (24-Hour) . 150 milliGRAM(s) Oral daily  cefepime   IVPB 1000 milliGRAM(s) IV Intermittent every 8 hours  cefepime   IVPB 1000 milliGRAM(s) IV Intermittent every 8 hours  lamoTRIgine 100 milliGRAM(s) Oral daily  potassium chloride    Tablet ER 20 milliEquivalent(s) Oral every 2 hours  sodium chloride 0.9%. 1000 milliLiter(s) IV Continuous <Continuous>    PRN MEDICATIONS  acetaminophen     Tablet .. 650 milliGRAM(s) Oral every 6 hours PRN  aluminum hydroxide/magnesium hydroxide/simethicone Suspension 30 milliLiter(s) Oral every 4 hours PRN  clonazePAM  Tablet 0.5 milliGRAM(s) Oral daily PRN  melatonin 5 milliGRAM(s) Oral at bedtime PRN  morphine  - Injectable 4 milliGRAM(s) IV Push every 4 hours PRN  ondansetron Injectable 4 milliGRAM(s) IV Push every 8 hours PRN  zolpidem 5 milliGRAM(s) Oral at bedtime PRN  zolpidem 5 milliGRAM(s) Oral at bedtime PRN    VITALS:   T(F): 97.3  HR: 101  BP: 123/66  RR: 18  SpO2: 99%    LABS:                        12.2   10.83 )-----------( 254      ( 26 Sep 2023 05:59 )             35.6     09-26    138  |  99  |  5<L>  ----------------------------<  95  3.3<L>   |  29  |  0.5<L>    Ca    9.0      26 Sep 2023 05:59    TPro  5.9<L>  /  Alb  3.4<L>  /  TBili  0.3  /  DBili  x   /  AST  30  /  ALT  54<H>  /  AlkPhos  94  09-26    PT/INR - ( 25 Sep 2023 14:08 )   PT: 12.90 sec;   INR: 1.13 ratio         PTT - ( 25 Sep 2023 14:08 )  PTT:22.5 sec  Urinalysis Basic - ( 26 Sep 2023 05:59 )    Color: x / Appearance: x / SG: x / pH: x  Gluc: 95 mg/dL / Ketone: x  / Bili: x / Urobili: x   Blood: x / Protein: x / Nitrite: x   Leuk Esterase: x / RBC: x / WBC x   Sq Epi: x / Non Sq Epi: x / Bacteria: x      Lactate, Blood: 0.8 mmol/L (09-26-23 @ 05:59)      RADIOLOGY:  CT Abdomen and Pelvis w/ IV Cont (09.25.23): striated nephrogram of the bilateral kidneys consistent with pyelonephritis. Mild left hydroureter with enhancement of the urothelial wall, consistent with ascending infection.    PHYSICAL EXAM:  GEN: No acute distress  LUNGS: Clear to auscultation bilaterally   HEART: S1/S2 present. RRR.   ABD: Soft, lower abd tender, non-distended. Bowel sounds present  EXT: NC/NC/NE/2+PP/EDMONDS  NEURO: AAOX3  SKIN: intact

## 2023-09-26 NOTE — DIETITIAN INITIAL EVALUATION ADULT - PERTINENT MEDS FT
MEDICATIONS  (STANDING):  buPROPion XL (24-Hour) . 150 milliGRAM(s) Oral daily  cefepime   IVPB 1000 milliGRAM(s) IV Intermittent every 8 hours  cefepime   IVPB 1000 milliGRAM(s) IV Intermittent every 8 hours  lamoTRIgine 100 milliGRAM(s) Oral daily  potassium chloride    Tablet ER 20 milliEquivalent(s) Oral every 2 hours  sodium chloride 0.9%. 1000 milliLiter(s) (100 mL/Hr) IV Continuous <Continuous>    MEDICATIONS  (PRN):  acetaminophen     Tablet .. 650 milliGRAM(s) Oral every 6 hours PRN Temp greater or equal to 38C (100.4F), Mild Pain (1 - 3)  aluminum hydroxide/magnesium hydroxide/simethicone Suspension 30 milliLiter(s) Oral every 4 hours PRN Dyspepsia  clonazePAM  Tablet 0.5 milliGRAM(s) Oral daily PRN anxiety  melatonin 5 milliGRAM(s) Oral at bedtime PRN Insomnia  morphine  - Injectable 4 milliGRAM(s) IV Push every 4 hours PRN Severe Pain (7 - 10)  ondansetron Injectable 4 milliGRAM(s) IV Push every 8 hours PRN Nausea and/or Vomiting  zolpidem 5 milliGRAM(s) Oral at bedtime PRN Insomnia  zolpidem 5 milliGRAM(s) Oral at bedtime PRN Insomnia

## 2023-09-27 LAB
ANION GAP SERPL CALC-SCNC: 13 MMOL/L — SIGNIFICANT CHANGE UP (ref 7–14)
BASOPHILS # BLD AUTO: 0.04 K/UL — SIGNIFICANT CHANGE UP (ref 0–0.2)
BASOPHILS NFR BLD AUTO: 0.6 % — SIGNIFICANT CHANGE UP (ref 0–1)
BUN SERPL-MCNC: 4 MG/DL — LOW (ref 10–20)
CALCIUM SERPL-MCNC: 8.9 MG/DL — SIGNIFICANT CHANGE UP (ref 8.4–10.5)
CHLORIDE SERPL-SCNC: 103 MMOL/L — SIGNIFICANT CHANGE UP (ref 98–110)
CO2 SERPL-SCNC: 25 MMOL/L — SIGNIFICANT CHANGE UP (ref 17–32)
CREAT SERPL-MCNC: 0.5 MG/DL — LOW (ref 0.7–1.5)
EGFR: 109 ML/MIN/1.73M2 — SIGNIFICANT CHANGE UP
EOSINOPHIL # BLD AUTO: 0.14 K/UL — SIGNIFICANT CHANGE UP (ref 0–0.7)
EOSINOPHIL NFR BLD AUTO: 1.9 % — SIGNIFICANT CHANGE UP (ref 0–8)
GLUCOSE BLDC GLUCOMTR-MCNC: 144 MG/DL — HIGH (ref 70–99)
GLUCOSE SERPL-MCNC: 99 MG/DL — SIGNIFICANT CHANGE UP (ref 70–99)
HCT VFR BLD CALC: 34.4 % — LOW (ref 37–47)
HGB BLD-MCNC: 12 G/DL — SIGNIFICANT CHANGE UP (ref 12–16)
IMM GRANULOCYTES NFR BLD AUTO: 0.4 % — HIGH (ref 0.1–0.3)
LYMPHOCYTES # BLD AUTO: 1.7 K/UL — SIGNIFICANT CHANGE UP (ref 1.2–3.4)
LYMPHOCYTES # BLD AUTO: 23.5 % — SIGNIFICANT CHANGE UP (ref 20.5–51.1)
MAGNESIUM SERPL-MCNC: 2.2 MG/DL — SIGNIFICANT CHANGE UP (ref 1.8–2.4)
MCHC RBC-ENTMCNC: 29.9 PG — SIGNIFICANT CHANGE UP (ref 27–31)
MCHC RBC-ENTMCNC: 34.9 G/DL — SIGNIFICANT CHANGE UP (ref 32–37)
MCV RBC AUTO: 85.6 FL — SIGNIFICANT CHANGE UP (ref 81–99)
MONOCYTES # BLD AUTO: 0.77 K/UL — HIGH (ref 0.1–0.6)
MONOCYTES NFR BLD AUTO: 10.7 % — HIGH (ref 1.7–9.3)
NEUTROPHILS # BLD AUTO: 4.55 K/UL — SIGNIFICANT CHANGE UP (ref 1.4–6.5)
NEUTROPHILS NFR BLD AUTO: 62.9 % — SIGNIFICANT CHANGE UP (ref 42.2–75.2)
NRBC # BLD: 0 /100 WBCS — SIGNIFICANT CHANGE UP (ref 0–0)
PLATELET # BLD AUTO: 305 K/UL — SIGNIFICANT CHANGE UP (ref 130–400)
PMV BLD: 12.5 FL — HIGH (ref 7.4–10.4)
POTASSIUM SERPL-MCNC: 3.9 MMOL/L — SIGNIFICANT CHANGE UP (ref 3.5–5)
POTASSIUM SERPL-SCNC: 3.9 MMOL/L — SIGNIFICANT CHANGE UP (ref 3.5–5)
RBC # BLD: 4.02 M/UL — LOW (ref 4.2–5.4)
RBC # FLD: 12.8 % — SIGNIFICANT CHANGE UP (ref 11.5–14.5)
SODIUM SERPL-SCNC: 141 MMOL/L — SIGNIFICANT CHANGE UP (ref 135–146)
WBC # BLD: 7.23 K/UL — SIGNIFICANT CHANGE UP (ref 4.8–10.8)
WBC # FLD AUTO: 7.23 K/UL — SIGNIFICANT CHANGE UP (ref 4.8–10.8)

## 2023-09-27 PROCEDURE — 99232 SBSQ HOSP IP/OBS MODERATE 35: CPT

## 2023-09-27 RX ADMIN — CEFEPIME 100 MILLIGRAM(S): 1 INJECTION, POWDER, FOR SOLUTION INTRAMUSCULAR; INTRAVENOUS at 21:09

## 2023-09-27 RX ADMIN — CEFEPIME 100 MILLIGRAM(S): 1 INJECTION, POWDER, FOR SOLUTION INTRAMUSCULAR; INTRAVENOUS at 14:28

## 2023-09-27 RX ADMIN — Medication 650 MILLIGRAM(S): at 04:36

## 2023-09-27 RX ADMIN — CEFEPIME 100 MILLIGRAM(S): 1 INJECTION, POWDER, FOR SOLUTION INTRAMUSCULAR; INTRAVENOUS at 05:08

## 2023-09-27 RX ADMIN — SODIUM CHLORIDE 100 MILLILITER(S): 9 INJECTION INTRAMUSCULAR; INTRAVENOUS; SUBCUTANEOUS at 11:27

## 2023-09-27 RX ADMIN — MORPHINE SULFATE 4 MILLIGRAM(S): 50 CAPSULE, EXTENDED RELEASE ORAL at 19:12

## 2023-09-27 RX ADMIN — Medication 650 MILLIGRAM(S): at 03:01

## 2023-09-27 RX ADMIN — LAMOTRIGINE 100 MILLIGRAM(S): 25 TABLET, ORALLY DISINTEGRATING ORAL at 21:09

## 2023-09-27 RX ADMIN — ONDANSETRON 4 MILLIGRAM(S): 8 TABLET, FILM COATED ORAL at 11:18

## 2023-09-27 RX ADMIN — MORPHINE SULFATE 4 MILLIGRAM(S): 50 CAPSULE, EXTENDED RELEASE ORAL at 11:21

## 2023-09-27 RX ADMIN — ONDANSETRON 4 MILLIGRAM(S): 8 TABLET, FILM COATED ORAL at 19:12

## 2023-09-27 RX ADMIN — BUPROPION HYDROCHLORIDE 150 MILLIGRAM(S): 150 TABLET, EXTENDED RELEASE ORAL at 14:34

## 2023-09-27 NOTE — PROGRESS NOTE ADULT - ASSESSMENT
59 y/o female pmhx, PXE, Blindness, presents with c/o lower abdomen pain, radiating across bilateral lower abdomen.  Pt states symptoms started a week ago with Pain, Dysuria, Frequency, malodorous urine, chills and vomiting.  Pt states started taking Tylenol with little improvement.  Pt denies recent UTI treatment.    # Pyelonephritis  # Left mild hydroureter  # Sepsis on admission  - Tmax 101  - CT Abdomen and Pelvis w/ IV Cont (09.25.23): striated nephrogram of the bilateral kidneys consistent with pyelonephritis. Mild left hydroureter with enhancement of the urothelial wall, consistent with ascending infection.  - c/w IV cefepime 1 gram q 8 hours  - f/u with urine and blood cultures  - ID following     # Hypokalemia  - K repleted    # Hx of Pseudoxanthoma elasticum  - c/w home Klonopin and Lamictal     # Depression   - c/w Wellbutrin    # DVT ppx   57 y/o female pmhx, PXE, Blindness, presents with c/o lower abdomen pain, radiating across bilateral lower abdomen.  Pt states symptoms started a week ago with Pain, Dysuria, Frequency, malodorous urine, chills and vomiting.  Pt states started taking Tylenol with little improvement.  Pt denies recent UTI treatment.    # Pyelonephritis  # Left mild hydroureter  # Sepsis on admission  - hemodynamically stable   - CT Abdomen and Pelvis w/ IV Cont (09.25.23): striated nephrogram of the bilateral kidneys consistent with pyelonephritis. Mild left hydroureter with enhancement of the urothelial wall, consistent with ascending infection.  - c/w IV cefepime 1 gram q 8 hours  - blood cx: no growth  - UCx: E Coli - pending sensitivities   - ID following     # Hx of Pseudoxanthoma elasticum  - c/w home Klonopin and Lamictal     # Depression   - c/w Wellbutrin    # DVT ppx    # Anticipated for discharge

## 2023-09-27 NOTE — PROGRESS NOTE ADULT - SUBJECTIVE AND OBJECTIVE BOX
SUBJECTIVE:    Patient is a 58y old Female who presents with a chief complaint of Pyelonephritis (27 Sep 2023 07:19)    Currently admitted to medicine with the primary diagnosis of Pyelonephritis     Today is hospital day 2d. This morning she is resting comfortably in bed and reports overnight groin pain.     PAST MEDICAL & SURGICAL HISTORY  Pseudoxanthoma elasticum-like papillary dermal elastolysis    H/O blindness    PAD (peripheral artery disease)      SOCIAL HISTORY:  Negative for smoking/alcohol/drug use.     ALLERGIES:  Toradol (Vomiting)    MEDICATIONS:  STANDING MEDICATIONS  buPROPion XL (24-Hour) . 150 milliGRAM(s) Oral daily  cefepime   IVPB 1000 milliGRAM(s) IV Intermittent every 8 hours  lamoTRIgine 100 milliGRAM(s) Oral daily  sodium chloride 0.9%. 1000 milliLiter(s) IV Continuous <Continuous>    PRN MEDICATIONS  acetaminophen     Tablet .. 650 milliGRAM(s) Oral every 6 hours PRN  aluminum hydroxide/magnesium hydroxide/simethicone Suspension 30 milliLiter(s) Oral every 4 hours PRN  clonazePAM  Tablet 0.5 milliGRAM(s) Oral daily PRN  melatonin 5 milliGRAM(s) Oral at bedtime PRN  morphine  - Injectable 4 milliGRAM(s) IV Push every 4 hours PRN  ondansetron Injectable 4 milliGRAM(s) IV Push every 8 hours PRN  zolpidem 5 milliGRAM(s) Oral at bedtime PRN  zolpidem 5 milliGRAM(s) Oral at bedtime PRN    VITALS:   T(F): 97.9  HR: 89  BP: 142/65  RR: 18  SpO2: 99%    LABS:                        12.0   7.23  )-----------( 305      ( 27 Sep 2023 06:21 )             34.4     09-27    141  |  103  |  4<L>  ----------------------------<  99  3.9   |  25  |  0.5<L>    Ca    8.9      27 Sep 2023 06:21  Mg     2.2     09-27    TPro  5.9<L>  /  Alb  3.4<L>  /  TBili  0.3  /  DBili  x   /  AST  30  /  ALT  54<H>  /  AlkPhos  94  09-26      Urinalysis Basic - ( 27 Sep 2023 06:21 )    Color: x / Appearance: x / SG: x / pH: x  Gluc: 99 mg/dL / Ketone: x  / Bili: x / Urobili: x   Blood: x / Protein: x / Nitrite: x   Leuk Esterase: x / RBC: x / WBC x   Sq Epi: x / Non Sq Epi: x / Bacteria: x        Culture - Blood (collected 25 Sep 2023 16:58)  Source: .Blood Blood-Peripheral  Preliminary Report (27 Sep 2023 01:02):    No growth at 24 hours    Culture - Blood (collected 25 Sep 2023 16:58)  Source: .Blood Blood-Peripheral  Preliminary Report (27 Sep 2023 01:02):    No growth at 24 hours    Culture - Urine (collected 25 Sep 2023 14:07)  Source: Clean Catch Clean Catch (Midstream)  Preliminary Report (26 Sep 2023 22:23):    >100,000 CFU/ml Escherichia coli      RADIOLOGY:  CT Abdomen and Pelvis w/ IV Cont (09.25.23): IMPRESSION: Striated nephrogram of the bilateral kidneys consistent with pyelonephritis. Mild left hydroureter with enhancement of the urothelial wall, consistent with ascending infection.    PHYSICAL EXAM:  GEN: No acute distress  LUNGS: Clear to auscultation bilaterally   HEART: S1/S2 present. RRR.   ABD: Soft, non-tender, non-distended. Bowel sounds present  EXT: NC/NC/NE/2+PP/EDMONDS  NEURO: AAOX3  SKIN: intact

## 2023-09-28 LAB
-  AMIKACIN: SIGNIFICANT CHANGE UP
-  AMOXICILLIN/CLAVULANIC ACID: SIGNIFICANT CHANGE UP
-  AMPICILLIN/SULBACTAM: SIGNIFICANT CHANGE UP
-  AMPICILLIN: SIGNIFICANT CHANGE UP
-  AZTREONAM: SIGNIFICANT CHANGE UP
-  CEFAZOLIN: SIGNIFICANT CHANGE UP
-  CEFEPIME: SIGNIFICANT CHANGE UP
-  CEFTRIAXONE: SIGNIFICANT CHANGE UP
-  CEFUROXIME: SIGNIFICANT CHANGE UP
-  CIPROFLOXACIN: SIGNIFICANT CHANGE UP
-  ERTAPENEM: SIGNIFICANT CHANGE UP
-  GENTAMICIN: SIGNIFICANT CHANGE UP
-  IMIPENEM: SIGNIFICANT CHANGE UP
-  LEVOFLOXACIN: SIGNIFICANT CHANGE UP
-  MEROPENEM: SIGNIFICANT CHANGE UP
-  NITROFURANTOIN: SIGNIFICANT CHANGE UP
-  PIPERACILLIN/TAZOBACTAM: SIGNIFICANT CHANGE UP
-  TOBRAMYCIN: SIGNIFICANT CHANGE UP
-  TRIMETHOPRIM/SULFAMETHOXAZOLE: SIGNIFICANT CHANGE UP
ANION GAP SERPL CALC-SCNC: 12 MMOL/L — SIGNIFICANT CHANGE UP (ref 7–14)
BASOPHILS # BLD AUTO: 0.04 K/UL — SIGNIFICANT CHANGE UP (ref 0–0.2)
BASOPHILS NFR BLD AUTO: 0.6 % — SIGNIFICANT CHANGE UP (ref 0–1)
BUN SERPL-MCNC: 6 MG/DL — LOW (ref 10–20)
CALCIUM SERPL-MCNC: 9.4 MG/DL — SIGNIFICANT CHANGE UP (ref 8.4–10.5)
CHLORIDE SERPL-SCNC: 105 MMOL/L — SIGNIFICANT CHANGE UP (ref 98–110)
CK MB CFR SERPL CALC: 1.4 NG/ML — SIGNIFICANT CHANGE UP (ref 0.6–6.3)
CK SERPL-CCNC: 35 U/L — SIGNIFICANT CHANGE UP (ref 0–225)
CO2 SERPL-SCNC: 23 MMOL/L — SIGNIFICANT CHANGE UP (ref 17–32)
CREAT SERPL-MCNC: 0.5 MG/DL — LOW (ref 0.7–1.5)
CULTURE RESULTS: SIGNIFICANT CHANGE UP
EGFR: 109 ML/MIN/1.73M2 — SIGNIFICANT CHANGE UP
EOSINOPHIL # BLD AUTO: 0.31 K/UL — SIGNIFICANT CHANGE UP (ref 0–0.7)
EOSINOPHIL NFR BLD AUTO: 4.5 % — SIGNIFICANT CHANGE UP (ref 0–8)
GLUCOSE SERPL-MCNC: 78 MG/DL — SIGNIFICANT CHANGE UP (ref 70–99)
HCT VFR BLD CALC: 34.1 % — LOW (ref 37–47)
HGB BLD-MCNC: 12 G/DL — SIGNIFICANT CHANGE UP (ref 12–16)
IMM GRANULOCYTES NFR BLD AUTO: 0.6 % — HIGH (ref 0.1–0.3)
LYMPHOCYTES # BLD AUTO: 2.05 K/UL — SIGNIFICANT CHANGE UP (ref 1.2–3.4)
LYMPHOCYTES # BLD AUTO: 29.9 % — SIGNIFICANT CHANGE UP (ref 20.5–51.1)
MAGNESIUM SERPL-MCNC: 2.1 MG/DL — SIGNIFICANT CHANGE UP (ref 1.8–2.4)
MCHC RBC-ENTMCNC: 30.1 PG — SIGNIFICANT CHANGE UP (ref 27–31)
MCHC RBC-ENTMCNC: 35.2 G/DL — SIGNIFICANT CHANGE UP (ref 32–37)
MCV RBC AUTO: 85.5 FL — SIGNIFICANT CHANGE UP (ref 81–99)
METHOD TYPE: SIGNIFICANT CHANGE UP
MONOCYTES # BLD AUTO: 0.66 K/UL — HIGH (ref 0.1–0.6)
MONOCYTES NFR BLD AUTO: 9.6 % — HIGH (ref 1.7–9.3)
NEUTROPHILS # BLD AUTO: 3.76 K/UL — SIGNIFICANT CHANGE UP (ref 1.4–6.5)
NEUTROPHILS NFR BLD AUTO: 54.8 % — SIGNIFICANT CHANGE UP (ref 42.2–75.2)
NRBC # BLD: 0 /100 WBCS — SIGNIFICANT CHANGE UP (ref 0–0)
ORGANISM # SPEC MICROSCOPIC CNT: SIGNIFICANT CHANGE UP
ORGANISM # SPEC MICROSCOPIC CNT: SIGNIFICANT CHANGE UP
PLATELET # BLD AUTO: 367 K/UL — SIGNIFICANT CHANGE UP (ref 130–400)
PMV BLD: 12.1 FL — HIGH (ref 7.4–10.4)
POTASSIUM SERPL-MCNC: 4 MMOL/L — SIGNIFICANT CHANGE UP (ref 3.5–5)
POTASSIUM SERPL-SCNC: 4 MMOL/L — SIGNIFICANT CHANGE UP (ref 3.5–5)
RBC # BLD: 3.99 M/UL — LOW (ref 4.2–5.4)
RBC # FLD: 12.9 % — SIGNIFICANT CHANGE UP (ref 11.5–14.5)
SODIUM SERPL-SCNC: 140 MMOL/L — SIGNIFICANT CHANGE UP (ref 135–146)
SPECIMEN SOURCE: SIGNIFICANT CHANGE UP
TROPONIN T SERPL-MCNC: <0.01 NG/ML — SIGNIFICANT CHANGE UP
WBC # BLD: 6.86 K/UL — SIGNIFICANT CHANGE UP (ref 4.8–10.8)
WBC # FLD AUTO: 6.86 K/UL — SIGNIFICANT CHANGE UP (ref 4.8–10.8)

## 2023-09-28 PROCEDURE — 99232 SBSQ HOSP IP/OBS MODERATE 35: CPT

## 2023-09-28 PROCEDURE — 93010 ELECTROCARDIOGRAM REPORT: CPT

## 2023-09-28 PROCEDURE — 76770 US EXAM ABDO BACK WALL COMP: CPT | Mod: 26

## 2023-09-28 RX ORDER — METHOCARBAMOL 500 MG/1
500 TABLET, FILM COATED ORAL
Refills: 0 | Status: COMPLETED | OUTPATIENT
Start: 2023-09-28 | End: 2023-09-29

## 2023-09-28 RX ORDER — ERTAPENEM SODIUM 1 G/1
1000 INJECTION, POWDER, LYOPHILIZED, FOR SOLUTION INTRAMUSCULAR; INTRAVENOUS EVERY 24 HOURS
Refills: 0 | Status: DISCONTINUED | OUTPATIENT
Start: 2023-09-28 | End: 2023-10-02

## 2023-09-28 RX ADMIN — CEFEPIME 100 MILLIGRAM(S): 1 INJECTION, POWDER, FOR SOLUTION INTRAMUSCULAR; INTRAVENOUS at 05:33

## 2023-09-28 RX ADMIN — MORPHINE SULFATE 4 MILLIGRAM(S): 50 CAPSULE, EXTENDED RELEASE ORAL at 13:14

## 2023-09-28 RX ADMIN — MORPHINE SULFATE 4 MILLIGRAM(S): 50 CAPSULE, EXTENDED RELEASE ORAL at 03:53

## 2023-09-28 RX ADMIN — MORPHINE SULFATE 4 MILLIGRAM(S): 50 CAPSULE, EXTENDED RELEASE ORAL at 22:29

## 2023-09-28 RX ADMIN — ERTAPENEM SODIUM 120 MILLIGRAM(S): 1 INJECTION, POWDER, LYOPHILIZED, FOR SOLUTION INTRAMUSCULAR; INTRAVENOUS at 15:45

## 2023-09-28 RX ADMIN — BUPROPION HYDROCHLORIDE 150 MILLIGRAM(S): 150 TABLET, EXTENDED RELEASE ORAL at 15:46

## 2023-09-28 RX ADMIN — Medication 0.5 MILLIGRAM(S): at 22:02

## 2023-09-28 RX ADMIN — LAMOTRIGINE 100 MILLIGRAM(S): 25 TABLET, ORALLY DISINTEGRATING ORAL at 20:23

## 2023-09-28 RX ADMIN — METHOCARBAMOL 500 MILLIGRAM(S): 500 TABLET, FILM COATED ORAL at 20:24

## 2023-09-28 RX ADMIN — ZOLPIDEM TARTRATE 5 MILLIGRAM(S): 10 TABLET ORAL at 23:15

## 2023-09-28 RX ADMIN — MORPHINE SULFATE 4 MILLIGRAM(S): 50 CAPSULE, EXTENDED RELEASE ORAL at 22:02

## 2023-09-28 RX ADMIN — ONDANSETRON 4 MILLIGRAM(S): 8 TABLET, FILM COATED ORAL at 03:53

## 2023-09-28 RX ADMIN — MORPHINE SULFATE 4 MILLIGRAM(S): 50 CAPSULE, EXTENDED RELEASE ORAL at 04:27

## 2023-09-28 RX ADMIN — ONDANSETRON 4 MILLIGRAM(S): 8 TABLET, FILM COATED ORAL at 13:11

## 2023-09-28 NOTE — PROGRESS NOTE ADULT - ASSESSMENT
59 y/o female pmhx, PXE, Blindness, presents with c/o lower abdomen pain, radiating across bilateral lower abdomen.  Pt states symptoms started a week ago with Pain, Dysuria, Frequency, malodorous urine, chills and vomiting.  Pt states started taking Tylenol with little improvement.  Pt denies recent UTI treatment.    # Pyelonephritis  # Left mild hydroureter  # Sepsis on admission  - hemodynamically stable   - CT Abdomen and Pelvis w/ IV Cont (09.25.23): striated nephrogram of the bilateral kidneys consistent with pyelonephritis. Mild left hydroureter with enhancement of the urothelial wall, consistent with ascending infection.  - c/w IV cefepime 1 gram q 8 hours  - blood cx: no growth  - UCx: E Coli ESBL - abx switched to ertapenem as per ID   - ID following     # Right flank / groin pain  - renal sono, no stones, pyelonephritis  - pain can be from pyelo vs muscular  - trial of Robaxin 500 mg  - check CK  - if persistent pain with negative w/up, will do CT abd with IV cst.    # Hx of Pseudoxanthoma elasticum  - c/w home Klonopin and Lamictal     # Depression   - c/w Wellbutrin    # DVT ppx    # Acute for now.

## 2023-09-28 NOTE — PROGRESS NOTE ADULT - SUBJECTIVE AND OBJECTIVE BOX
SUBJECTIVE:    Patient is a 58y old Female who presents with a chief complaint of Pyelonephritis (27 Sep 2023 07:19)    Currently admitted to medicine with the primary diagnosis of Pyelonephritis     Today is hospital day 3d. This morning she reports right flank and groin pain, 10/10 in severity.    PAST MEDICAL & SURGICAL HISTORY  Pseudoxanthoma elasticum-like papillary dermal elastolysis    H/O blindness    PAD (peripheral artery disease)      SOCIAL HISTORY:  Negative for smoking/alcohol/drug use.     ALLERGIES:  Toradol (Vomiting)      PHYSICAL EXAM:  GEN: No acute distress  LUNGS: Clear to auscultation bilaterally   HEART: S1/S2 present. RRR.   ABD: Soft, tenderness over right upper and lower quadrants + right flank tenderness.  EXT: NC/NC/NE/2+PP/EDMONDS  NEURO: AAOX3  SKIN: intact         RADIOLOGY:  < from: US Kidney and Bladder (09.28.23 @ 08:53) >  Mild post void residual.    Focused area of echogenicity corresponding to CT findings consistent with   pyelonephritis.    < end of copied text >    CT Abdomen and Pelvis w/ IV Cont (09.25.23): IMPRESSION: Striated nephrogram of the bilateral kidneys consistent with pyelonephritis. Mild left hydroureter with enhancement of the urothelial wall, consistent with ascending infection.

## 2023-09-28 NOTE — PROVIDER CONTACT NOTE (OTHER) - ACTION/TREATMENT ORDERED:
MD Ryan aware. EKG called per MD Ryan. Pt. pending MD meza.
LOUISE Miller made aware. No new orders at this time.   MD Callejas also made aware.

## 2023-09-28 NOTE — PROGRESS NOTE ADULT - SUBJECTIVE AND OBJECTIVE BOX
RICHY STERLING  58y, Female    LOS  3d    INTERVAL EVENTS/HPI  - No acute events overnight  - T(F): , Max: 98.8 (09-27-23 @ 17:25)  - Denies any worsening symptoms  - Tolerating medication  - WBC Count: 6.86 (09-28-23 @ 06:45)  WBC Count: 7.23 (09-27-23 @ 06:21)  - Creatinine: 0.5 (09-28-23 @ 06:45)  Creatinine: 0.5 (09-27-23 @ 06:21)  -Burning persists.   -Back pain happening.   -Had one episode of diarrhea.     REVIEW OF SYSTEMS:  CONSTITUTIONAL: No fever or chills  HEENT: No sore throat  RESPIRATORY: No cough, no shortness of breath  CARDIOVASCULAR: No chest pain or palpitations  GASTROINTESTINAL: No abdominal or epigastric pain  GENITOURINARY: No dysuria  NEUROLOGICAL: No headache/dizziness  MSK: No joint pain, erythema, or swelling; no back pain  SKIN: No itching, rashes  All other ROS negative except noted above    Prior hospital charts reviewed [Yes]  Primary team notes reviewed [Yes]  Other consultant notes reviewed [Yes]    ANTIMICROBIALS:   ertapenem  IVPB 1000 every 24 hours      OTHER MEDS: MEDICATIONS  (STANDING):  acetaminophen     Tablet .. 650 every 6 hours PRN  aluminum hydroxide/magnesium hydroxide/simethicone Suspension 30 every 4 hours PRN  buPROPion XL (24-Hour) . 150 daily  clonazePAM  Tablet 0.5 daily PRN  lamoTRIgine 100 daily  melatonin 5 at bedtime PRN  morphine  - Injectable 4 every 4 hours PRN  ondansetron Injectable 4 every 8 hours PRN  zolpidem 5 at bedtime PRN  zolpidem 5 at bedtime PRN      Vital Signs Last 24 Hrs  T(F): 96.1 (09-28-23 @ 14:27), Max: 101.1 (09-25-23 @ 13:51)    Vital Signs Last 24 Hrs  HR: 85 (09-28-23 @ 14:27) (82 - 96)  BP: 182/84 (09-28-23 @ 14:27) (145/65 - 184/85)  RR: 18 (09-28-23 @ 14:27)  SpO2: --  Wt(kg): --    EXAM:  GENERAL: NAD, lying in bed, in mild distress  HEAD: No head lesions. Visual blindness.   NECK: Supple, nontender to palpation  CHEST/LUNG: Clear to auscultation bilaterally  HEART: S1 S2  ABDOMEN: Soft, supra-pubic tenderness noted. Mild left CVA tenderness.   EXTREMITIES: No clubbing, cyanosis, or petal edema  NERVOUS SYSTEM: Alert and oriented to person, time, place and situation, speech clear  MSK: No joint erythema, swelling or pain  SKIN: No rashes or lesions, no superficial thrombophlebitis      Labs:                        12.0   6.86  )-----------( 367      ( 28 Sep 2023 06:45 )             34.1     09-28    140  |  105  |  6<L>  ----------------------------<  78  4.0   |  23  |  0.5<L>    Ca    9.4      28 Sep 2023 06:45  Mg     2.1     09-28        WBC Trend:  WBC Count: 6.86 (09-28-23 @ 06:45)  WBC Count: 7.23 (09-27-23 @ 06:21)  WBC Count: 10.83 (09-26-23 @ 05:59)  WBC Count: 12.34 (09-25-23 @ 14:07)      Creatine Trend:  Creatinine: 0.5 (09-28)  Creatinine: 0.5 (09-27)  Creatinine: 0.5 (09-26)  Creatinine: 0.6 (09-25)      Liver Biochemical Testing Trend:  Alanine Aminotransferase (ALT/SGPT): 54 *H* (09-26)  Alanine Aminotransferase (ALT/SGPT): 62 *H* (09-25)  Aspartate Aminotransferase (AST/SGOT): 30 (09-26-23 @ 05:59)  Aspartate Aminotransferase (AST/SGOT): 37 (09-25-23 @ 14:07)  Bilirubin Total: 0.3 (09-26)  Bilirubin Total: 0.3 (09-25)      Trend LDH      Urinalysis Basic - ( 28 Sep 2023 06:45 )    Color: x / Appearance: x / SG: x / pH: x  Gluc: 78 mg/dL / Ketone: x  / Bili: x / Urobili: x   Blood: x / Protein: x / Nitrite: x   Leuk Esterase: x / RBC: x / WBC x   Sq Epi: x / Non Sq Epi: x / Bacteria: x        MICROBIOLOGY:    Female    Culture - Blood (collected 25 Sep 2023 16:58)  Source: .Blood Blood-Peripheral  Preliminary Report:    No growth at 48 Hours    Culture - Blood (collected 25 Sep 2023 16:58)  Source: .Blood Blood-Peripheral  Preliminary Report:    No growth at 48 Hours    Culture - Urine (collected 25 Sep 2023 14:07)  Source: Clean Catch Clean Catch (Midstream)  Final Report:    >100,000 CFU/ml Escherichia coli ESBL  Organism: Escherichia coli ESBL  Organism: Escherichia coli ESBL    Sensitivities:      Method Type: TOVA      -  Amikacin: S <=16      -  Amoxicillin/Clavulanic Acid: S <=8/4 Consider reserving for cystitis when ampicillin/sulbactam is resistant      -  Ampicillin: R >16 These ampicillin results predict results for amoxicillin      -  Ampicillin/Sulbactam: R >16/8      -  Aztreonam: R >16      -  Cefazolin: R >16 For uncomplicated UTI with K. pneumoniae, E. coli, or P. mirablis: TOVA <=16 is sensitive and TOVA >=32 is resistant. This also predicts results for oral agents cefaclor, cefdinir, cefpodoxime, cefprozil, cefuroxime axetil, cephalexin and locarbef for uncomplicated UTI. Note that some isolates may be susceptible to these agents while testing resistant to cefazolin.      -  Cefepime: R >16      -  Ceftriaxone: R >32      -  Cefuroxime: R >16      -  Ciprofloxacin: R >2      -  Ertapenem: S <=0.5      -  Gentamicin: R >8      -  Imipenem: S <=1      -  Levofloxacin: R >4      -  Meropenem: S <=1      -  Nitrofurantoin: S <=32 Should not be used to treat pyelonephritis      -  Piperacillin/Tazobactam: S <=8      -  Tobramycin: I 8      -  Trimethoprim/Sulfamethoxazole: R >2/38    Lactate, Blood: 0.8 (09-26 @ 05:59)        RADIOLOGY & ADDITIONAL TESTS:  I have personally reviewed the relevant images.   CXR      CT  CT Abdomen and Pelvis w/ IV Cont:   ACC: [id number] EXAM:  CT ABDOMEN AND PELVIS IC   ORDERED BY: RHIANNON DEL CID     PROCEDURE DATE:  09/25/2023          INTERPRETATION:  CLINICAL STATEMENT: Left-sided abdominal pain to flank   pain    TECHNIQUE: Contiguous axial CT images were obtained from the lower chest   to the pubic symphysis following administration of intravenous contrast.    90 cc administered of Omnipaque 300 (10 cc discarded).  Oral contrast was   not administered.  Reformatted images in the coronal and sagittal planes   were acquired.    COMPARISON CT: None.    OTHER STUDIES USED FOR CORRELATION: None.      FINDINGS:    LOWER CHEST: Unremarkable.    HEPATOBILIARY: Left hepatic 1.4 cm cyst.    SPLEEN: Unremarkable.    PANCREAS: Unremarkable.    ADRENAL GLANDS: Unremarkable.    KIDNEYS: There are striated nephrogram of the bilateral kidneys,   consistent with pyelonephritis. Too small to characterize hypodensities   are seen in the bilateral kidneys. Mild left hydroureter with enhancement   of the urothelial wall, consistent with ascending infection.    ABDOMINOPELVIC NODES: Unremarkable.    PELVIC ORGANS: Unremarkable.    PERITONEUM/MESENTERY/BOWEL: No bowel obstruction, pneumoperitoneum or   ascites. A normal appendix is seen in the right lower quadrant.    BONES/SOFT TISSUES: Degenerative changes of the spine.    OTHER: Mild atherosclerotic calcifications of the aorta.      IMPRESSION: Striated nephrogram of the bilateral kidneys consistent with   pyelonephritis.    Mild left hydroureter with enhancement of the urothelial wall, consistent   with ascending infection.    --- End of Report ---            JUSTINE HA MD; Attending Radiologist  This document has been electronically signed. Sep 25 2023  5:50PM (09-25-23 @ 15:48)    < from: US Kidney and Bladder (09.28.23 @ 08:53) >  FINDINGS:    Right kidney: 11.8 x 4.3 x 6.2 cm. Focused area of midpole echogenicity.   No hydronephrosis or nephrolithiasis    Left kidney:  12.6 x 7.3 x 5.8 cm.. No hydronephrosis or calculi.    Urinary bladder: No debris or calculus. Bilateral ureteral jets are   visualized. Prevoid volume of approximately 335 cc.  Postvoid volume is   approximately 190 cc.    < end of copied text >      WEIGHT  Weight (kg): 56.6 (09-25-23 @ 22:36)  Creatinine: 0.5 mg/dL (09-28-23 @ 06:45)      All available historical records have been reviewed

## 2023-09-28 NOTE — PROGRESS NOTE ADULT - ASSESSMENT
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    IMPRESSION  #Pyelonephritis  #Left mild hydroureter  #Sepsis on admission    RECOMMENDATIONS  -Urine cultures suggestive of E.coli ESBL  -Switch to ertapenem 1 gram q 24 hours.   -Avoid urinary retention.  -Tentative plan for IV antibiotics for total of 7 days.     If any questions, please send a message or call on [employer] Teams  Please continue to update ID with any pertinent new laboratory or radiographic findings.    Asia Mccray M.D  Infectious Diseases Attending  [facility]

## 2023-09-28 NOTE — PROVIDER CONTACT NOTE (OTHER) - ASSESSMENT
V/S: BP - 156/70 HR - 84 SpO2 - 100% on RA T - 96.8   FS - 85
V/S: BP - 149/79 HR - 90 T - 98.8   FS - 144

## 2023-09-28 NOTE — CHART NOTE - NSCHARTNOTEFT_GEN_A_CORE
Patient with increased pain, will order ultrasound of kidney and bladder Patient with increased pain, she is concerned about kidney stone. Better with warm compresses and analgesics. Will order ultrasound of kidney and bladder

## 2023-09-28 NOTE — PROVIDER CONTACT NOTE (OTHER) - SITUATION
Pt. reports feeling chest pain, shortness of breath, dizziness, feels like she "almost passed out".
Pt. reports dizziness, abdominal pain/nausea after eating a meal.

## 2023-09-29 LAB
ALBUMIN SERPL ELPH-MCNC: 3.7 G/DL — SIGNIFICANT CHANGE UP (ref 3.5–5.2)
ALP SERPL-CCNC: 142 U/L — HIGH (ref 30–115)
ALT FLD-CCNC: 123 U/L — HIGH (ref 0–41)
ANION GAP SERPL CALC-SCNC: 10 MMOL/L — SIGNIFICANT CHANGE UP (ref 7–14)
AST SERPL-CCNC: 31 U/L — SIGNIFICANT CHANGE UP (ref 0–41)
BASOPHILS # BLD AUTO: 0.04 K/UL — SIGNIFICANT CHANGE UP (ref 0–0.2)
BASOPHILS NFR BLD AUTO: 0.6 % — SIGNIFICANT CHANGE UP (ref 0–1)
BILIRUB SERPL-MCNC: 0.3 MG/DL — SIGNIFICANT CHANGE UP (ref 0.2–1.2)
BUN SERPL-MCNC: 9 MG/DL — LOW (ref 10–20)
CALCIUM SERPL-MCNC: 9.7 MG/DL — SIGNIFICANT CHANGE UP (ref 8.4–10.5)
CHLORIDE SERPL-SCNC: 104 MMOL/L — SIGNIFICANT CHANGE UP (ref 98–110)
CO2 SERPL-SCNC: 25 MMOL/L — SIGNIFICANT CHANGE UP (ref 17–32)
CREAT SERPL-MCNC: 0.6 MG/DL — LOW (ref 0.7–1.5)
EGFR: 105 ML/MIN/1.73M2 — SIGNIFICANT CHANGE UP
EOSINOPHIL # BLD AUTO: 0.3 K/UL — SIGNIFICANT CHANGE UP (ref 0–0.7)
EOSINOPHIL NFR BLD AUTO: 4.6 % — SIGNIFICANT CHANGE UP (ref 0–8)
GLUCOSE SERPL-MCNC: 73 MG/DL — SIGNIFICANT CHANGE UP (ref 70–99)
HCT VFR BLD CALC: 38 % — SIGNIFICANT CHANGE UP (ref 37–47)
HGB BLD-MCNC: 12.6 G/DL — SIGNIFICANT CHANGE UP (ref 12–16)
IMM GRANULOCYTES NFR BLD AUTO: 1.4 % — HIGH (ref 0.1–0.3)
LYMPHOCYTES # BLD AUTO: 1.65 K/UL — SIGNIFICANT CHANGE UP (ref 1.2–3.4)
LYMPHOCYTES # BLD AUTO: 25 % — SIGNIFICANT CHANGE UP (ref 20.5–51.1)
MAGNESIUM SERPL-MCNC: 2.2 MG/DL — SIGNIFICANT CHANGE UP (ref 1.8–2.4)
MCHC RBC-ENTMCNC: 29.4 PG — SIGNIFICANT CHANGE UP (ref 27–31)
MCHC RBC-ENTMCNC: 33.2 G/DL — SIGNIFICANT CHANGE UP (ref 32–37)
MCV RBC AUTO: 88.6 FL — SIGNIFICANT CHANGE UP (ref 81–99)
MONOCYTES # BLD AUTO: 0.56 K/UL — SIGNIFICANT CHANGE UP (ref 0.1–0.6)
MONOCYTES NFR BLD AUTO: 8.5 % — SIGNIFICANT CHANGE UP (ref 1.7–9.3)
NEUTROPHILS # BLD AUTO: 3.95 K/UL — SIGNIFICANT CHANGE UP (ref 1.4–6.5)
NEUTROPHILS NFR BLD AUTO: 59.9 % — SIGNIFICANT CHANGE UP (ref 42.2–75.2)
NRBC # BLD: 0 /100 WBCS — SIGNIFICANT CHANGE UP (ref 0–0)
PLATELET # BLD AUTO: 454 K/UL — HIGH (ref 130–400)
PMV BLD: 11.2 FL — HIGH (ref 7.4–10.4)
POTASSIUM SERPL-MCNC: 4.6 MMOL/L — SIGNIFICANT CHANGE UP (ref 3.5–5)
POTASSIUM SERPL-SCNC: 4.6 MMOL/L — SIGNIFICANT CHANGE UP (ref 3.5–5)
PROT SERPL-MCNC: 6.2 G/DL — SIGNIFICANT CHANGE UP (ref 6–8)
RBC # BLD: 4.29 M/UL — SIGNIFICANT CHANGE UP (ref 4.2–5.4)
RBC # FLD: 12.9 % — SIGNIFICANT CHANGE UP (ref 11.5–14.5)
SODIUM SERPL-SCNC: 139 MMOL/L — SIGNIFICANT CHANGE UP (ref 135–146)
WBC # BLD: 6.59 K/UL — SIGNIFICANT CHANGE UP (ref 4.8–10.8)
WBC # FLD AUTO: 6.59 K/UL — SIGNIFICANT CHANGE UP (ref 4.8–10.8)

## 2023-09-29 PROCEDURE — 99232 SBSQ HOSP IP/OBS MODERATE 35: CPT

## 2023-09-29 PROCEDURE — 74177 CT ABD & PELVIS W/CONTRAST: CPT | Mod: 26

## 2023-09-29 RX ORDER — LIDOCAINE 4 G/100G
1 CREAM TOPICAL DAILY
Refills: 0 | Status: COMPLETED | OUTPATIENT
Start: 2023-09-29 | End: 2023-10-01

## 2023-09-29 RX ORDER — METHOCARBAMOL 500 MG/1
500 TABLET, FILM COATED ORAL
Refills: 0 | Status: COMPLETED | OUTPATIENT
Start: 2023-09-29 | End: 2023-10-01

## 2023-09-29 RX ADMIN — MORPHINE SULFATE 4 MILLIGRAM(S): 50 CAPSULE, EXTENDED RELEASE ORAL at 15:43

## 2023-09-29 RX ADMIN — METHOCARBAMOL 500 MILLIGRAM(S): 500 TABLET, FILM COATED ORAL at 17:09

## 2023-09-29 RX ADMIN — LIDOCAINE 1 PATCH: 4 CREAM TOPICAL at 15:30

## 2023-09-29 RX ADMIN — BUPROPION HYDROCHLORIDE 150 MILLIGRAM(S): 150 TABLET, EXTENDED RELEASE ORAL at 11:30

## 2023-09-29 RX ADMIN — METHOCARBAMOL 500 MILLIGRAM(S): 500 TABLET, FILM COATED ORAL at 06:27

## 2023-09-29 RX ADMIN — MORPHINE SULFATE 4 MILLIGRAM(S): 50 CAPSULE, EXTENDED RELEASE ORAL at 16:00

## 2023-09-29 RX ADMIN — ERTAPENEM SODIUM 120 MILLIGRAM(S): 1 INJECTION, POWDER, LYOPHILIZED, FOR SOLUTION INTRAMUSCULAR; INTRAVENOUS at 15:29

## 2023-09-29 RX ADMIN — Medication 0.5 MILLIGRAM(S): at 22:06

## 2023-09-29 RX ADMIN — LAMOTRIGINE 100 MILLIGRAM(S): 25 TABLET, ORALLY DISINTEGRATING ORAL at 22:06

## 2023-09-29 NOTE — PROGRESS NOTE ADULT - SUBJECTIVE AND OBJECTIVE BOX
SUBJECTIVE:    Patient is a 58y old Female who presents with a chief complaint of Pyelonephritis (27 Sep 2023 07:19)    Currently admitted to medicine with the primary diagnosis of Pyelonephritis     Today is hospital day 3d. This morning she reports right flank and groin pain, 10/10 in severity.    PAST MEDICAL & SURGICAL HISTORY  Pseudoxanthoma elasticum-like papillary dermal elastolysis    H/O blindness    PAD (peripheral artery disease)      SOCIAL HISTORY:  Negative for smoking/alcohol/drug use.     ALLERGIES:  Toradol (Vomiting)      PHYSICAL EXAM:  GEN: No acute distress  LUNGS: Clear to auscultation bilaterally   HEART: S1/S2 present. RRR.   ABD: Soft, tenderness over right upper and lower quadrants + right flank tenderness.  EXT: NC/NC/NE/2+PP/EDMONDS  NEURO: AAOX3  SKIN: intact         VITALS:   T(C): 37.1 (29 Sep 2023 14:23), Max: 37.1 (29 Sep 2023 14:23)  T(F): 98.7 (29 Sep 2023 14:23), Max: 98.7 (29 Sep 2023 14:23)  HR: 88 (29 Sep 2023 14:23) (81 - 88)  BP: 135/71 (29 Sep 2023 14:23) (124/57 - 163/75)  RR: 18 (29 Sep 2023 14:23) (18 - 18)  SpO2: 96% (29 Sep 2023 14:23) (96% - 100%)        I&Os:  09-28-23 @ 07:01  -  09-29-23 @ 07:00  --------------------------------------------------------  IN: 0 mL / OUT: 600 mL / NET: -600 mL        MEDICATIONS:  STANDING MEDICATIONS  buPROPion XL (24-Hour) . 150 milliGRAM(s) Oral daily  ertapenem  IVPB 1000 milliGRAM(s) IV Intermittent every 24 hours  lamoTRIgine 100 milliGRAM(s) Oral daily  lidocaine   4% Patch 1 Patch Transdermal daily  methocarbamol 500 milliGRAM(s) Oral two times a day  sodium chloride 0.9%. 1000 milliLiter(s) IV Continuous <Continuous>    PRN MEDICATIONS  acetaminophen     Tablet .. 650 milliGRAM(s) Oral every 6 hours PRN  aluminum hydroxide/magnesium hydroxide/simethicone Suspension 30 milliLiter(s) Oral every 4 hours PRN  clonazePAM  Tablet 0.5 milliGRAM(s) Oral daily PRN  melatonin 5 milliGRAM(s) Oral at bedtime PRN  morphine  - Injectable 4 milliGRAM(s) IV Push every 4 hours PRN  ondansetron Injectable 4 milliGRAM(s) IV Push every 8 hours PRN  zolpidem 5 milliGRAM(s) Oral at bedtime PRN  zolpidem 5 milliGRAM(s) Oral at bedtime PRN    Diet, Regular (09-25-23 @ 20:33) [Active]    LABS:                        12.6   6.59  )-----------( 454      ( 29 Sep 2023 06:20 )             38.0     WBC trend: 6.59 <--, 6.86 <--, 7.23 <--, 10.83 <--  Hgb: 12.6 [09-29-23 @ 06:20]<--, 12.0 [09-28-23 @ 06:45]<--, 12.0 [09-27-23 @ 06:21]<--, 12.2 [09-26-23 @ 05:59]<--, 13.9 [09-25-23 @ 14:07]<--    09-29    139  |  104  |  9<L>  ----------------------------<  73  4.6   |  25  |  0.6<L>    Ca    9.7      29 Sep 2023 06:20  Mg     2.2     09-29    TPro  6.2  /  Alb  3.7  /  TBili  0.3  /  DBili  x   /  AST  31  /  ALT  123<H>  /  AlkPhos  142<H>  09-29    Creatinine trend: 0.6<--, 0.5<--, 0.5<--, 0.5<--, 0.6<--    SODIUM TREND: Sodium 139 [09-29 @ 06:20]<--, Sodium 140 [09-28 @ 06:45]<--, Sodium 141 [09-27 @ 06:21]<--, Sodium 138 [09-26 @ 05:59]<--, Sodium 132 [09-25 @ 14:07]<--    POC Glucose: 85 [09-28-23 @ 17:03]<--            Culture - Blood (collected 09-25-23 @ 16:58)  Source: .Blood Blood-Peripheral  Preliminary Report (09-29-23 @ 01:02):    No growth at 72 Hours    Culture - Blood (collected 09-25-23 @ 16:58)  Source: .Blood Blood-Peripheral  Preliminary Report (09-29-23 @ 01:02):    No growth at 72 Hours    Culture - Urine (collected 09-25-23 @ 14:07)  Source: Clean Catch Clean Catch (Midstream)  Final Report (09-28-23 @ 08:16):    >100,000 CFU/ml Escherichia coli ESBL  Organism: Escherichia coli ESBL (09-28-23 @ 08:16)  Organism: Escherichia coli ESBL (09-28-23 @ 08:16)      CARDIAC MARKERS ( 28 Sep 2023 18:55 )  x     / <0.01 ng/mL / 35 U/L / x     / 1.4 ng/mL        Urinalysis Basic - ( 29 Sep 2023 06:20 )    Color: x / Appearance: x / SG: x / pH: x  Gluc: 73 mg/dL / Ketone: x  / Bili: x / Urobili: x   Blood: x / Protein: x / Nitrite: x   Leuk Esterase: x / RBC: x / WBC x   Sq Epi: x / Non Sq Epi: x / Bacteria: x        RADIOLOGY:  < from: US Kidney and Bladder (09.28.23 @ 08:53) >  Mild post void residual.    Focused area of echogenicity corresponding to CT findings consistent with   pyelonephritis.    < end of copied text >    CT Abdomen and Pelvis w/ IV Cont (09.25.23): IMPRESSION: Striated nephrogram of the bilateral kidneys consistent with pyelonephritis. Mild left hydroureter with enhancement of the urothelial wall, consistent with ascending infection.   SUBJECTIVE:    Patient is a 58y old Female who presents with a chief complaint of Pyelonephritis (27 Sep 2023 07:19)    Currently admitted to medicine with the primary diagnosis of Pyelonephritis     Today is hospital day 3d. Patient was started on methocarbamol yesterday. Though patient denies any improvement in her pain but she looks more comfortable today. She reports right flank and groin pain, Family at bedside reports that she had some delusional thoughts like nurses are judging her for clothing and not happy with her. Family also reports that she was not taking her lamictal for about 10 weeks prior to presenting in the hospital.    PAST MEDICAL & SURGICAL HISTORY  Pseudoxanthoma elasticum-like papillary dermal elastolysis    H/O blindness    PAD (peripheral artery disease)      SOCIAL HISTORY:  Negative for smoking/alcohol/drug use.     ALLERGIES:  Toradol (Vomiting)      PHYSICAL EXAM:  GEN: No acute distress  LUNGS: Clear to auscultation bilaterally   HEART: S1/S2 present. RRR.   ABD: Soft, tenderness over right upper and lower quadrants + right flank tenderness.  EXT: NC/NC/NE/2+PP/EDMONDS  NEURO: AAOX3  SKIN: intact         VITALS:   T(C): 37.1 (29 Sep 2023 14:23), Max: 37.1 (29 Sep 2023 14:23)  T(F): 98.7 (29 Sep 2023 14:23), Max: 98.7 (29 Sep 2023 14:23)  HR: 88 (29 Sep 2023 14:23) (81 - 88)  BP: 135/71 (29 Sep 2023 14:23) (124/57 - 163/75)  RR: 18 (29 Sep 2023 14:23) (18 - 18)  SpO2: 96% (29 Sep 2023 14:23) (96% - 100%)        I&Os:  09-28-23 @ 07:01  -  09-29-23 @ 07:00  --------------------------------------------------------  IN: 0 mL / OUT: 600 mL / NET: -600 mL        MEDICATIONS:  STANDING MEDICATIONS  buPROPion XL (24-Hour) . 150 milliGRAM(s) Oral daily  ertapenem  IVPB 1000 milliGRAM(s) IV Intermittent every 24 hours  lamoTRIgine 100 milliGRAM(s) Oral daily  lidocaine   4% Patch 1 Patch Transdermal daily  methocarbamol 500 milliGRAM(s) Oral two times a day  sodium chloride 0.9%. 1000 milliLiter(s) IV Continuous <Continuous>    PRN MEDICATIONS  acetaminophen     Tablet .. 650 milliGRAM(s) Oral every 6 hours PRN  aluminum hydroxide/magnesium hydroxide/simethicone Suspension 30 milliLiter(s) Oral every 4 hours PRN  clonazePAM  Tablet 0.5 milliGRAM(s) Oral daily PRN  melatonin 5 milliGRAM(s) Oral at bedtime PRN  morphine  - Injectable 4 milliGRAM(s) IV Push every 4 hours PRN  ondansetron Injectable 4 milliGRAM(s) IV Push every 8 hours PRN  zolpidem 5 milliGRAM(s) Oral at bedtime PRN  zolpidem 5 milliGRAM(s) Oral at bedtime PRN    Diet, Regular (09-25-23 @ 20:33) [Active]    LABS:                        12.6   6.59  )-----------( 454      ( 29 Sep 2023 06:20 )             38.0     WBC trend: 6.59 <--, 6.86 <--, 7.23 <--, 10.83 <--  Hgb: 12.6 [09-29-23 @ 06:20]<--, 12.0 [09-28-23 @ 06:45]<--, 12.0 [09-27-23 @ 06:21]<--, 12.2 [09-26-23 @ 05:59]<--, 13.9 [09-25-23 @ 14:07]<--    09-29    139  |  104  |  9<L>  ----------------------------<  73  4.6   |  25  |  0.6<L>    Ca    9.7      29 Sep 2023 06:20  Mg     2.2     09-29    TPro  6.2  /  Alb  3.7  /  TBili  0.3  /  DBili  x   /  AST  31  /  ALT  123<H>  /  AlkPhos  142<H>  09-29    Creatinine trend: 0.6<--, 0.5<--, 0.5<--, 0.5<--, 0.6<--    SODIUM TREND: Sodium 139 [09-29 @ 06:20]<--, Sodium 140 [09-28 @ 06:45]<--, Sodium 141 [09-27 @ 06:21]<--, Sodium 138 [09-26 @ 05:59]<--, Sodium 132 [09-25 @ 14:07]<--    POC Glucose: 85 [09-28-23 @ 17:03]<--            Culture - Blood (collected 09-25-23 @ 16:58)  Source: .Blood Blood-Peripheral  Preliminary Report (09-29-23 @ 01:02):    No growth at 72 Hours    Culture - Blood (collected 09-25-23 @ 16:58)  Source: .Blood Blood-Peripheral  Preliminary Report (09-29-23 @ 01:02):    No growth at 72 Hours    Culture - Urine (collected 09-25-23 @ 14:07)  Source: Clean Catch Clean Catch (Midstream)  Final Report (09-28-23 @ 08:16):    >100,000 CFU/ml Escherichia coli ESBL  Organism: Escherichia coli ESBL (09-28-23 @ 08:16)  Organism: Escherichia coli ESBL (09-28-23 @ 08:16)      CARDIAC MARKERS ( 28 Sep 2023 18:55 )  x     / <0.01 ng/mL / 35 U/L / x     / 1.4 ng/mL        Urinalysis Basic - ( 29 Sep 2023 06:20 )    Color: x / Appearance: x / SG: x / pH: x  Gluc: 73 mg/dL / Ketone: x  / Bili: x / Urobili: x   Blood: x / Protein: x / Nitrite: x   Leuk Esterase: x / RBC: x / WBC x   Sq Epi: x / Non Sq Epi: x / Bacteria: x        RADIOLOGY:  < from: US Kidney and Bladder (09.28.23 @ 08:53) >  Mild post void residual.    Focused area of echogenicity corresponding to CT findings consistent with   pyelonephritis.    < end of copied text >    CT Abdomen and Pelvis w/ IV Cont (09.25.23): IMPRESSION: Striated nephrogram of the bilateral kidneys consistent with pyelonephritis. Mild left hydroureter with enhancement of the urothelial wall, consistent with ascending infection.

## 2023-09-29 NOTE — PROGRESS NOTE ADULT - SUBJECTIVE AND OBJECTIVE BOX
RICHY STERLING  58y, Female    LOS  4d    INTERVAL EVENTS/HPI  - No acute events overnight  - T(F): , Max: 98 (09-29-23 @ 05:00)  - Denies any worsening symptoms  - Tolerating medication  - WBC Count: 6.59 (09-29-23 @ 06:20)  WBC Count: 6.86 (09-28-23 @ 06:45)  - Creatinine: 0.5 (09-28-23 @ 06:45)     REVIEW OF SYSTEMS:  CONSTITUTIONAL: No fever or chills  HEENT: No sore throat  RESPIRATORY: No cough, no shortness of breath  CARDIOVASCULAR: No chest pain or palpitations  GASTROINTESTINAL: No abdominal or epigastric pain  GENITOURINARY: No dysuria  NEUROLOGICAL: No headache/dizziness  MSK: No joint pain, erythema, or swelling; no back pain  SKIN: No itching, rashes  All other ROS negative except noted above    Prior hospital charts reviewed [Yes]  Primary team notes reviewed [Yes]  Other consultant notes reviewed [Yes]      ANTIMICROBIALS:   ertapenem  IVPB 1000 every 24 hours      OTHER MEDS: MEDICATIONS  (STANDING):  acetaminophen     Tablet .. 650 every 6 hours PRN  aluminum hydroxide/magnesium hydroxide/simethicone Suspension 30 every 4 hours PRN  buPROPion XL (24-Hour) . 150 daily  clonazePAM  Tablet 0.5 daily PRN  lamoTRIgine 100 daily  melatonin 5 at bedtime PRN  morphine  - Injectable 4 every 4 hours PRN  ondansetron Injectable 4 every 8 hours PRN  zolpidem 5 at bedtime PRN  zolpidem 5 at bedtime PRN      Vital Signs Last 24 Hrs  T(F): 98 (09-29-23 @ 05:00), Max: 101.1 (09-25-23 @ 13:51)    Vital Signs Last 24 Hrs  HR: 83 (09-29-23 @ 05:00) (81 - 96)  BP: 124/57 (09-29-23 @ 05:00) (124/57 - 184/85)  RR: 18 (09-29-23 @ 05:00)  SpO2: 97% (09-29-23 @ 05:00) (97% - 100%)  Wt(kg): --    EXAM:  GENERAL: NAD, lying in bed, in mild distress  HEAD: No head lesions. Visual blindness.   NECK: Supple, nontender to palpation  CHEST/LUNG: Clear to auscultation bilaterally  HEART: S1 S2  ABDOMEN: Soft, non-tender.   EXTREMITIES: No clubbing, cyanosis, or petal edema  NERVOUS SYSTEM: Alert and oriented to person, time, place and situation, speech clear  MSK: No joint erythema, swelling or pain  SKIN: No rashes or lesions, no superficial thrombophlebitis      Labs:                        12.6   6.59  )-----------( 454      ( 29 Sep 2023 06:20 )             38.0     09-28    140  |  105  |  6<L>  ----------------------------<  78  4.0   |  23  |  0.5<L>    Ca    9.4      28 Sep 2023 06:45  Mg     2.1     09-28        WBC Trend:  WBC Count: 6.59 (09-29-23 @ 06:20)  WBC Count: 6.86 (09-28-23 @ 06:45)  WBC Count: 7.23 (09-27-23 @ 06:21)  WBC Count: 10.83 (09-26-23 @ 05:59)      Creatine Trend:  Creatinine: 0.5 (09-28)  Creatinine: 0.5 (09-27)  Creatinine: 0.5 (09-26)  Creatinine: 0.6 (09-25)      Liver Biochemical Testing Trend:  Alanine Aminotransferase (ALT/SGPT): 54 *H* (09-26)  Alanine Aminotransferase (ALT/SGPT): 62 *H* (09-25)  Aspartate Aminotransferase (AST/SGOT): 30 (09-26-23 @ 05:59)  Aspartate Aminotransferase (AST/SGOT): 37 (09-25-23 @ 14:07)  Bilirubin Total: 0.3 (09-26)  Bilirubin Total: 0.3 (09-25)      Trend LDH      Urinalysis Basic - ( 28 Sep 2023 06:45 )    Color: x / Appearance: x / SG: x / pH: x  Gluc: 78 mg/dL / Ketone: x  / Bili: x / Urobili: x   Blood: x / Protein: x / Nitrite: x   Leuk Esterase: x / RBC: x / WBC x   Sq Epi: x / Non Sq Epi: x / Bacteria: x        MICROBIOLOGY:    Female    Culture - Blood (collected 25 Sep 2023 16:58)  Source: .Blood Blood-Peripheral  Preliminary Report:    No growth at 72 Hours    Culture - Blood (collected 25 Sep 2023 16:58)  Source: .Blood Blood-Peripheral  Preliminary Report:    No growth at 72 Hours    Culture - Urine (collected 25 Sep 2023 14:07)  Source: Clean Catch Clean Catch (Midstream)  Final Report:    >100,000 CFU/ml Escherichia coli ESBL  Organism: Escherichia coli ESBL  Organism: Escherichia coli ESBL    Sensitivities:      Method Type: TOVA      -  Amikacin: S <=16      -  Amoxicillin/Clavulanic Acid: S <=8/4 Consider reserving for cystitis when ampicillin/sulbactam is resistant      -  Ampicillin: R >16 These ampicillin results predict results for amoxicillin      -  Ampicillin/Sulbactam: R >16/8      -  Aztreonam: R >16      -  Cefazolin: R >16 For uncomplicated UTI with K. pneumoniae, E. coli, or P. mirablis: TOVA <=16 is sensitive and TOVA >=32 is resistant. This also predicts results for oral agents cefaclor, cefdinir, cefpodoxime, cefprozil, cefuroxime axetil, cephalexin and locarbef for uncomplicated UTI. Note that some isolates may be susceptible to these agents while testing resistant to cefazolin.      -  Cefepime: R >16      -  Ceftriaxone: R >32      -  Cefuroxime: R >16      -  Ciprofloxacin: R >2      -  Ertapenem: S <=0.5      -  Gentamicin: R >8      -  Imipenem: S <=1      -  Levofloxacin: R >4      -  Meropenem: S <=1      -  Nitrofurantoin: S <=32 Should not be used to treat pyelonephritis      -  Piperacillin/Tazobactam: S <=8      -  Tobramycin: I 8      -  Trimethoprim/Sulfamethoxazole: R >2/38      RADIOLOGY & ADDITIONAL TESTS:  I have personally reviewed the relevant images.   CXR      CT        WEIGHT  Weight (kg): 56.6 (09-25-23 @ 22:36)      All available historical records have been reviewed       ASHER RICHY  58y, Female    LOS  4d    INTERVAL EVENTS/HPI  - No acute events overnight  - T(F): , Max: 98 (09-29-23 @ 05:00)  - Denies any worsening symptoms  - Tolerating medication  - WBC Count: 6.59 (09-29-23 @ 06:20)  WBC Count: 6.86 (09-28-23 @ 06:45)  - Creatinine: 0.5 (09-28-23 @ 06:45)  -Continues to have pain and dysuria. Still retaining urine out of fear of pain.      REVIEW OF SYSTEMS:  CONSTITUTIONAL: No fever or chills  HEENT: No sore throat  RESPIRATORY: No cough, no shortness of breath  CARDIOVASCULAR: No chest pain or palpitations  GASTROINTESTINAL: No abdominal or epigastric pain  GENITOURINARY: No dysuria  NEUROLOGICAL: No headache/dizziness  MSK: No joint pain, erythema, or swelling; no back pain  SKIN: No itching, rashes  All other ROS negative except noted above    Prior hospital charts reviewed [Yes]  Primary team notes reviewed [Yes]  Other consultant notes reviewed [Yes]      ANTIMICROBIALS:   ertapenem  IVPB 1000 every 24 hours      OTHER MEDS: MEDICATIONS  (STANDING):  acetaminophen     Tablet .. 650 every 6 hours PRN  aluminum hydroxide/magnesium hydroxide/simethicone Suspension 30 every 4 hours PRN  buPROPion XL (24-Hour) . 150 daily  clonazePAM  Tablet 0.5 daily PRN  lamoTRIgine 100 daily  melatonin 5 at bedtime PRN  morphine  - Injectable 4 every 4 hours PRN  ondansetron Injectable 4 every 8 hours PRN  zolpidem 5 at bedtime PRN  zolpidem 5 at bedtime PRN      Vital Signs Last 24 Hrs  T(F): 98 (09-29-23 @ 05:00), Max: 101.1 (09-25-23 @ 13:51)    Vital Signs Last 24 Hrs  HR: 83 (09-29-23 @ 05:00) (81 - 96)  BP: 124/57 (09-29-23 @ 05:00) (124/57 - 184/85)  RR: 18 (09-29-23 @ 05:00)  SpO2: 97% (09-29-23 @ 05:00) (97% - 100%)  Wt(kg): --    EXAM:  GENERAL: NAD, lying in bed, in mild distress  HEAD: No head lesions. Visual blindness.   NECK: Supple, nontender to palpation  CHEST/LUNG: Clear to auscultation bilaterally  HEART: S1 S2  ABDOMEN: Soft, Tenderness in R>L flank.   EXTREMITIES: No clubbing, cyanosis, or petal edema  NERVOUS SYSTEM: Alert and oriented to person, time, place and situation, speech clear  MSK: No joint erythema, swelling or pain  SKIN: No rashes or lesions, no superficial thrombophlebitis      Labs:                        12.6   6.59  )-----------( 454      ( 29 Sep 2023 06:20 )             38.0     09-28    140  |  105  |  6<L>  ----------------------------<  78  4.0   |  23  |  0.5<L>    Ca    9.4      28 Sep 2023 06:45  Mg     2.1     09-28        WBC Trend:  WBC Count: 6.59 (09-29-23 @ 06:20)  WBC Count: 6.86 (09-28-23 @ 06:45)  WBC Count: 7.23 (09-27-23 @ 06:21)  WBC Count: 10.83 (09-26-23 @ 05:59)      Creatine Trend:  Creatinine: 0.5 (09-28)  Creatinine: 0.5 (09-27)  Creatinine: 0.5 (09-26)  Creatinine: 0.6 (09-25)      Liver Biochemical Testing Trend:  Alanine Aminotransferase (ALT/SGPT): 54 *H* (09-26)  Alanine Aminotransferase (ALT/SGPT): 62 *H* (09-25)  Aspartate Aminotransferase (AST/SGOT): 30 (09-26-23 @ 05:59)  Aspartate Aminotransferase (AST/SGOT): 37 (09-25-23 @ 14:07)  Bilirubin Total: 0.3 (09-26)  Bilirubin Total: 0.3 (09-25)      Trend LDH      Urinalysis Basic - ( 28 Sep 2023 06:45 )    Color: x / Appearance: x / SG: x / pH: x  Gluc: 78 mg/dL / Ketone: x  / Bili: x / Urobili: x   Blood: x / Protein: x / Nitrite: x   Leuk Esterase: x / RBC: x / WBC x   Sq Epi: x / Non Sq Epi: x / Bacteria: x        MICROBIOLOGY:    Female    Culture - Blood (collected 25 Sep 2023 16:58)  Source: .Blood Blood-Peripheral  Preliminary Report:    No growth at 72 Hours    Culture - Blood (collected 25 Sep 2023 16:58)  Source: .Blood Blood-Peripheral  Preliminary Report:    No growth at 72 Hours    Culture - Urine (collected 25 Sep 2023 14:07)  Source: Clean Catch Clean Catch (Midstream)  Final Report:    >100,000 CFU/ml Escherichia coli ESBL  Organism: Escherichia coli ESBL  Organism: Escherichia coli ESBL    Sensitivities:      Method Type: TOVA      -  Amikacin: S <=16      -  Amoxicillin/Clavulanic Acid: S <=8/4 Consider reserving for cystitis when ampicillin/sulbactam is resistant      -  Ampicillin: R >16 These ampicillin results predict results for amoxicillin      -  Ampicillin/Sulbactam: R >16/8      -  Aztreonam: R >16      -  Cefazolin: R >16 For uncomplicated UTI with K. pneumoniae, E. coli, or P. mirablis: TOVA <=16 is sensitive and TOVA >=32 is resistant. This also predicts results for oral agents cefaclor, cefdinir, cefpodoxime, cefprozil, cefuroxime axetil, cephalexin and locarbef for uncomplicated UTI. Note that some isolates may be susceptible to these agents while testing resistant to cefazolin.      -  Cefepime: R >16      -  Ceftriaxone: R >32      -  Cefuroxime: R >16      -  Ciprofloxacin: R >2      -  Ertapenem: S <=0.5      -  Gentamicin: R >8      -  Imipenem: S <=1      -  Levofloxacin: R >4      -  Meropenem: S <=1      -  Nitrofurantoin: S <=32 Should not be used to treat pyelonephritis      -  Piperacillin/Tazobactam: S <=8      -  Tobramycin: I 8      -  Trimethoprim/Sulfamethoxazole: R >2/38      RADIOLOGY & ADDITIONAL TESTS:  I have personally reviewed the relevant images.   CXR      CT        WEIGHT  Weight (kg): 56.6 (09-25-23 @ 22:36)      All available historical records have been reviewed

## 2023-09-29 NOTE — PROGRESS NOTE ADULT - ASSESSMENT
59 y/o female pmhx, PXE, Blindness, presents with c/o lower abdomen pain, radiating across bilateral lower abdomen.  Pt states symptoms started a week ago with Pain, Dysuria, Frequency, malodorous urine, chills and vomiting.  Pt states started taking Tylenol with little improvement.  Pt denies recent UTI treatment.    # Pyelonephritis  # Left mild hydroureter  # Sepsis on admission  - hemodynamically stable   - CT Abdomen and Pelvis w/ IV Cont (09.25.23): striated nephrogram of the bilateral kidneys consistent with pyelonephritis. Mild left hydroureter with enhancement of the urothelial wall, consistent with ascending infection.  - c/w IV cefepime 1 gram q 8 hours  - blood cx: no growth  - UCx: E Coli ESBL - abx switched to ertapenem as per ID   - ID following     # Right flank / groin pain  - renal sono, no stones, pyelonephritis  - pain can be from pyelo vs muscular  - trial of Robaxin 500 mg  - check CK  - if persistent pain with negative w/up, will do CT abd with IV cst.    # Hx of Pseudoxanthoma elasticum  - c/w home Klonopin and Lamictal     # Depression   - c/w Wellbutrin    # DVT ppx    # Acute for now. 57 y/o female pmhx, PXE, Blindness, presents with c/o lower abdomen pain, radiating across bilateral lower abdomen.  Patient states symptoms started a week ago with Pain, Dysuria, Frequency, malodorous urine, chills and vomiting.  Pt states started taking Tylenol with little improvement.  Pt denies recent UTI treatment.    # Pyelonephritis  # Left mild hydroureter  # Sepsis on admission  - hemodynamically stable   - CT Abdomen and Pelvis w/ IV Cont (09.25.23): striated nephrogram of the bilateral kidneys consistent with pyelonephritis. Mild left hydroureter with enhancement of the urothelial wall, consistent with ascending infection.  - c/w IV cefepime 1 gram q 8 hours  - blood cx: no growth  - UCx: E Coli ESBL - abx switched to ertapenem as per ID   - ID following     # Right flank / groin pain  - renal sono, no stones, pyelonephritis  - pain can be from pyelo vs muscular  - CK level wnl  - c/w Robaxin 500 mg bid  - CT abd/pelvis w cst ordered    # Hx of Pseudoxanthoma elasticum  - c/w home Klonopin and Lamictal     # Depression   # h/o behavioral disorder: on Lamictal at home (as per family, she missed her Lamictal for about 10 weeks before presenting in the hospital)  # family reports that patient having some delusional thoughts  - c/w Wellbutrin  - c/w home dose of lamictal  - psych consult placed.    # DVT ppx    # Acute for now.

## 2023-09-29 NOTE — PROGRESS NOTE ADULT - ASSESSMENT
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    IMPRESSION  #Pyelonephritis  #Left mild hydroureter  #Sepsis on admission    RECOMMENDATIONS  -Urine cultures suggestive of E.coli ESBL  -Continue with ertapenem 1 gram q 24 hours.   -Avoid urinary retention.  -Tentative plan for IV antibiotics for total of 7 days.     If any questions, please send a message or call on [employer] Teams  Please continue to update ID with any pertinent new laboratory or radiographic findings.    Asia Mccray M.D  Infectious Diseases Attending  [facility]        57 y/o female pmhx of Pseudoxanthoma Elasticum, Central Blindness, PAD is presenting with c/o lower abdomen pain, radiating across bilateral lower abdomen.    IMPRESSION  #Pyelonephritis  #Left mild hydroureter  #Sepsis on admission    RECOMMENDATIONS  -Urine cultures suggestive of E.coli ESBL  -Continue with ertapenem 1 gram q 24 hours.   -Avoid urinary retention. Consider lidocaine topical or pyridium for dysuria. Alternative causes of pain being explored as well, because the intensity of symptoms do not entirely fit with pyelonephritis.   -Tentative plan for IV antibiotics for total of 7 days.     If any questions, please send a message or call on [email or web address] Teams  Please continue to update ID with any pertinent new laboratory or radiographic findings.    Asia Mccray M.D  Infectious Diseases Attending  [facility]

## 2023-09-30 DIAGNOSIS — F90.9 ATTENTION-DEFICIT HYPERACTIVITY DISORDER, UNSPECIFIED TYPE: ICD-10-CM

## 2023-09-30 LAB
ALBUMIN SERPL ELPH-MCNC: 4.1 G/DL — SIGNIFICANT CHANGE UP (ref 3.5–5.2)
ALP SERPL-CCNC: 141 U/L — HIGH (ref 30–115)
ALT FLD-CCNC: 99 U/L — HIGH (ref 0–41)
ANION GAP SERPL CALC-SCNC: 10 MMOL/L — SIGNIFICANT CHANGE UP (ref 7–14)
AST SERPL-CCNC: 28 U/L — SIGNIFICANT CHANGE UP (ref 0–41)
BASOPHILS # BLD AUTO: 0.04 K/UL — SIGNIFICANT CHANGE UP (ref 0–0.2)
BASOPHILS NFR BLD AUTO: 0.6 % — SIGNIFICANT CHANGE UP (ref 0–1)
BILIRUB SERPL-MCNC: 0.2 MG/DL — SIGNIFICANT CHANGE UP (ref 0.2–1.2)
BUN SERPL-MCNC: 11 MG/DL — SIGNIFICANT CHANGE UP (ref 10–20)
CALCIUM SERPL-MCNC: 10.3 MG/DL — SIGNIFICANT CHANGE UP (ref 8.4–10.5)
CHLORIDE SERPL-SCNC: 102 MMOL/L — SIGNIFICANT CHANGE UP (ref 98–110)
CO2 SERPL-SCNC: 28 MMOL/L — SIGNIFICANT CHANGE UP (ref 17–32)
CREAT SERPL-MCNC: 0.7 MG/DL — SIGNIFICANT CHANGE UP (ref 0.7–1.5)
EGFR: 101 ML/MIN/1.73M2 — SIGNIFICANT CHANGE UP
EOSINOPHIL # BLD AUTO: 0.29 K/UL — SIGNIFICANT CHANGE UP (ref 0–0.7)
EOSINOPHIL NFR BLD AUTO: 4.6 % — SIGNIFICANT CHANGE UP (ref 0–8)
GLUCOSE SERPL-MCNC: 72 MG/DL — SIGNIFICANT CHANGE UP (ref 70–99)
HCT VFR BLD CALC: 40.3 % — SIGNIFICANT CHANGE UP (ref 37–47)
HGB BLD-MCNC: 13.4 G/DL — SIGNIFICANT CHANGE UP (ref 12–16)
IMM GRANULOCYTES NFR BLD AUTO: 0.6 % — HIGH (ref 0.1–0.3)
LYMPHOCYTES # BLD AUTO: 1.97 K/UL — SIGNIFICANT CHANGE UP (ref 1.2–3.4)
LYMPHOCYTES # BLD AUTO: 31.2 % — SIGNIFICANT CHANGE UP (ref 20.5–51.1)
MAGNESIUM SERPL-MCNC: 2.4 MG/DL — SIGNIFICANT CHANGE UP (ref 1.8–2.4)
MCHC RBC-ENTMCNC: 29.5 PG — SIGNIFICANT CHANGE UP (ref 27–31)
MCHC RBC-ENTMCNC: 33.3 G/DL — SIGNIFICANT CHANGE UP (ref 32–37)
MCV RBC AUTO: 88.8 FL — SIGNIFICANT CHANGE UP (ref 81–99)
MONOCYTES # BLD AUTO: 0.58 K/UL — SIGNIFICANT CHANGE UP (ref 0.1–0.6)
MONOCYTES NFR BLD AUTO: 9.2 % — SIGNIFICANT CHANGE UP (ref 1.7–9.3)
NEUTROPHILS # BLD AUTO: 3.4 K/UL — SIGNIFICANT CHANGE UP (ref 1.4–6.5)
NEUTROPHILS NFR BLD AUTO: 53.8 % — SIGNIFICANT CHANGE UP (ref 42.2–75.2)
NRBC # BLD: 0 /100 WBCS — SIGNIFICANT CHANGE UP (ref 0–0)
PLATELET # BLD AUTO: 482 K/UL — HIGH (ref 130–400)
PMV BLD: 11.3 FL — HIGH (ref 7.4–10.4)
POTASSIUM SERPL-MCNC: 5.2 MMOL/L — HIGH (ref 3.5–5)
POTASSIUM SERPL-SCNC: 5.2 MMOL/L — HIGH (ref 3.5–5)
PROT SERPL-MCNC: 6.6 G/DL — SIGNIFICANT CHANGE UP (ref 6–8)
RBC # BLD: 4.54 M/UL — SIGNIFICANT CHANGE UP (ref 4.2–5.4)
RBC # FLD: 12.9 % — SIGNIFICANT CHANGE UP (ref 11.5–14.5)
SODIUM SERPL-SCNC: 140 MMOL/L — SIGNIFICANT CHANGE UP (ref 135–146)
WBC # BLD: 6.32 K/UL — SIGNIFICANT CHANGE UP (ref 4.8–10.8)
WBC # FLD AUTO: 6.32 K/UL — SIGNIFICANT CHANGE UP (ref 4.8–10.8)

## 2023-09-30 PROCEDURE — 90792 PSYCH DIAG EVAL W/MED SRVCS: CPT

## 2023-09-30 PROCEDURE — 99232 SBSQ HOSP IP/OBS MODERATE 35: CPT

## 2023-09-30 RX ORDER — MORPHINE SULFATE 50 MG/1
2 CAPSULE, EXTENDED RELEASE ORAL EVERY 6 HOURS
Refills: 0 | Status: DISCONTINUED | OUTPATIENT
Start: 2023-09-30 | End: 2023-10-02

## 2023-09-30 RX ORDER — OLANZAPINE 15 MG/1
2.5 TABLET, FILM COATED ORAL AT BEDTIME
Refills: 0 | Status: DISCONTINUED | OUTPATIENT
Start: 2023-09-30 | End: 2023-10-01

## 2023-09-30 RX ORDER — LAMOTRIGINE 25 MG/1
125 TABLET, ORALLY DISINTEGRATING ORAL DAILY
Refills: 0 | Status: DISCONTINUED | OUTPATIENT
Start: 2023-09-30 | End: 2023-10-01

## 2023-09-30 RX ADMIN — LIDOCAINE 1 PATCH: 4 CREAM TOPICAL at 17:18

## 2023-09-30 RX ADMIN — OLANZAPINE 2.5 MILLIGRAM(S): 15 TABLET, FILM COATED ORAL at 21:30

## 2023-09-30 RX ADMIN — MORPHINE SULFATE 2 MILLIGRAM(S): 50 CAPSULE, EXTENDED RELEASE ORAL at 15:58

## 2023-09-30 RX ADMIN — BUPROPION HYDROCHLORIDE 150 MILLIGRAM(S): 150 TABLET, EXTENDED RELEASE ORAL at 11:54

## 2023-09-30 RX ADMIN — MORPHINE SULFATE 2 MILLIGRAM(S): 50 CAPSULE, EXTENDED RELEASE ORAL at 15:38

## 2023-09-30 RX ADMIN — ERTAPENEM SODIUM 120 MILLIGRAM(S): 1 INJECTION, POWDER, LYOPHILIZED, FOR SOLUTION INTRAMUSCULAR; INTRAVENOUS at 15:42

## 2023-09-30 RX ADMIN — METHOCARBAMOL 500 MILLIGRAM(S): 500 TABLET, FILM COATED ORAL at 05:16

## 2023-09-30 RX ADMIN — LAMOTRIGINE 125 MILLIGRAM(S): 25 TABLET, ORALLY DISINTEGRATING ORAL at 21:30

## 2023-09-30 RX ADMIN — MORPHINE SULFATE 4 MILLIGRAM(S): 50 CAPSULE, EXTENDED RELEASE ORAL at 00:32

## 2023-09-30 RX ADMIN — METHOCARBAMOL 500 MILLIGRAM(S): 500 TABLET, FILM COATED ORAL at 17:20

## 2023-09-30 RX ADMIN — LIDOCAINE 1 PATCH: 4 CREAM TOPICAL at 11:54

## 2023-09-30 NOTE — BH CONSULTATION LIAISON ASSESSMENT NOTE - RISK ASSESSMENT
Risk factors: hx of serious suicide attempts requiring ICU admission, recent loss of independence and functional status, noncompliant with treatment, substance use  Protective factors: domiciled, intelligent, not endorsing current SA, motivated, supportive social network

## 2023-09-30 NOTE — BH CONSULTATION LIAISON ASSESSMENT NOTE - VIOLENCE RISK FACTORS:
Substance abuse/Affective dysregulation/Impulsivity/History of being victimized/traumatized/Noncompliance with treatment

## 2023-09-30 NOTE — BH CONSULTATION LIAISON ASSESSMENT NOTE - NSACTIVEVENT_PSY_ALL_CORE
Triggering events leading to humiliation, shame, and/or despair (e.g., Loss of relationship, financial or health status) (real or anticipated)/Chronic pain or other acute medical condition/Substance intoxication or withdrawal/Perceived burden on family or others

## 2023-09-30 NOTE — BH CONSULTATION LIAISON ASSESSMENT NOTE - NSBHATTESTCOMMENTATTENDFT_PSY_A_CORE
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 Psychiatry consulted for delusions and concern from boyfriend that patient is not acting like herself. I agree with the above assessment and plan.

## 2023-09-30 NOTE — BH CONSULTATION LIAISON ASSESSMENT NOTE - OTHER
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

## 2023-09-30 NOTE — PROGRESS NOTE ADULT - ASSESSMENT
57 y/o female pmhx, PXE, Blindness, presents with c/o lower abdomen pain, radiating across bilateral lower abdomen.  Patient states symptoms started a week ago with Pain, Dysuria, Frequency, malodorous urine, chills and vomiting.  Pt states started taking Tylenol with little improvement.  Pt denies recent UTI treatment.    # Pyelonephritis  # Left mild hydroureter  # Sepsis on admission  - hemodynamically stable   - CT Abdomen and Pelvis w/ IV Cont (09.25.23): striated nephrogram of the bilateral kidneys consistent with pyelonephritis. Mild left hydroureter with enhancement of the urothelial wall, consistent with ascending infection.  - c/w IV cefepime 1 gram q 8 hours  - blood cx: no growth  - UCx: E Coli ESBL - abx switched to ertapenem as per ID   - ID following     # Right flank / groin pain  - renal sono, no stones, pyelonephritis  - pain can be from pyelo vs muscular  - CK level wnl  - c/w Robaxin 500 mg bid  - CT abd/pelvis w cst: noted as above  - c/w current pain regimen    # Hx of Pseudoxanthoma elasticum  - c/w home Klonopin and Lamictal     # Depression / Bipolar disorder  # h/o ADD  # h/o behavioral disorder: on Lamictal at home (as per family, she missed her Lamictal for about 10 weeks before presenting in the hospital)  # family reports that patient having some delusional thoughts  - c/w Wellbutrin  - psych eval appreciated.  - lamictal increased to 125 mg. started on zyprexa 2.5 mg q hs  - psych follow up    DVT: n/a  GI: n/a  Diet: Regular  Activity: Increase as tolerated  Dispo: Anticipate for tomorrow

## 2023-09-30 NOTE — PROGRESS NOTE ADULT - SUBJECTIVE AND OBJECTIVE BOX
SUBJECTIVE:    Patient is a 58y old Female who presents with a chief complaint of Pyelonephritis (27 Sep 2023 07:19)    Currently admitted to medicine with the primary diagnosis of Pyelonephritis     Today is hospital day 4d. Patient reports that her pain in better controlled today. denies any new complaints. she is slowly trying to eat food today.      PAST MEDICAL & SURGICAL HISTORY  Pseudoxanthoma elasticum-like papillary dermal elastolysis    H/O blindness    PAD (peripheral artery disease)      SOCIAL HISTORY:  Negative for smoking/alcohol/drug use.     ALLERGIES:  Toradol (Vomiting)      PHYSICAL EXAM:  GEN: No acute distress  LUNGS: Clear to auscultation bilaterally   HEART: S1/S2 present. RRR.   ABD: Soft, tenderness over right upper and lower quadrants + right flank tenderness.  EXT: NC/NC/NE/2+PP/EDMONDS  NEURO: AAOX3  SKIN: intact         VITALS:   T(C): 36.3 (30 Sep 2023 14:06), Max: 36.3 (30 Sep 2023 14:06)  T(F): 97.4 (30 Sep 2023 14:06), Max: 97.4 (30 Sep 2023 14:06)  HR: 89 (30 Sep 2023 14:06) (78 - 89)  BP: 134/72 (30 Sep 2023 14:06) (118/56 - 134/72)  RR: 18 (30 Sep 2023 14:06) (18 - 18)  SpO2: --      I&Os:    MEDICATIONS:  STANDING MEDICATIONS  buPROPion XL (24-Hour) . 150 milliGRAM(s) Oral daily  ertapenem  IVPB 1000 milliGRAM(s) IV Intermittent every 24 hours  lamoTRIgine 125 milliGRAM(s) Oral daily  lidocaine   4% Patch 1 Patch Transdermal daily  methocarbamol 500 milliGRAM(s) Oral two times a day  OLANZapine 2.5 milliGRAM(s) Oral at bedtime  sodium chloride 0.9%. 1000 milliLiter(s) IV Continuous <Continuous>    PRN MEDICATIONS  acetaminophen     Tablet .. 650 milliGRAM(s) Oral every 6 hours PRN  aluminum hydroxide/magnesium hydroxide/simethicone Suspension 30 milliLiter(s) Oral every 4 hours PRN  clonazePAM  Tablet 0.5 milliGRAM(s) Oral daily PRN  melatonin 5 milliGRAM(s) Oral at bedtime PRN  morphine  - Injectable 2 milliGRAM(s) IV Push every 6 hours PRN  ondansetron Injectable 4 milliGRAM(s) IV Push every 8 hours PRN  zolpidem 5 milliGRAM(s) Oral at bedtime PRN  zolpidem 5 milliGRAM(s) Oral at bedtime PRN    Diet, Regular (09-25-23 @ 20:33) [Active]    LABS:                        13.4   6.32  )-----------( 482      ( 30 Sep 2023 07:38 )             40.3     WBC trend: 6.32 <--, 6.59 <--, 6.86 <--, 7.23 <--  Hgb: 13.4 [09-30-23 @ 07:38]<--, 12.6 [09-29-23 @ 06:20]<--, 12.0 [09-28-23 @ 06:45]<--, 12.0 [09-27-23 @ 06:21]<--, 12.2 [09-26-23 @ 05:59]<--, 13.9 [09-25-23 @ 14:07]<--    09-30    140  |  102  |  11  ----------------------------<  72  5.2<H>   |  28  |  0.7    Ca    10.3      30 Sep 2023 07:38  Mg     2.4     09-30    TPro  6.6  /  Alb  4.1  /  TBili  0.2  /  DBili  x   /  AST  28  /  ALT  99<H>  /  AlkPhos  141<H>  09-30    Creatinine trend: 0.7<--, 0.6<--, 0.5<--, 0.5<--, 0.5<--, 0.6<--    SODIUM TREND: Sodium 140 [09-30 @ 07:38]<--, Sodium 139 [09-29 @ 06:20]<--, Sodium 140 [09-28 @ 06:45]<--, Sodium 141 [09-27 @ 06:21]<--, Sodium 138 [09-26 @ 05:59]<--    POC Glucose:       Culture - Blood (collected 09-25-23 @ 16:58)  Source: .Blood Blood-Peripheral  Preliminary Report (09-30-23 @ 01:01):    No growth at 4 days    Culture - Blood (collected 09-25-23 @ 16:58)  Source: .Blood Blood-Peripheral  Preliminary Report (09-30-23 @ 01:01):    No growth at 4 days    Culture - Urine (collected 09-25-23 @ 14:07)  Source: Clean Catch Clean Catch (Midstream)  Final Report (09-28-23 @ 08:16):    >100,000 CFU/ml Escherichia coli ESBL  Organism: Escherichia coli ESBL (09-28-23 @ 08:16)  Organism: Escherichia coli ESBL (09-28-23 @ 08:16)      CARDIAC MARKERS ( 28 Sep 2023 18:55 )  x     / <0.01 ng/mL / 35 U/L / x     / 1.4 ng/mL          Urinalysis Basic - ( 30 Sep 2023 07:38 )    Color: x / Appearance: x / SG: x / pH: x  Gluc: 72 mg/dL / Ketone: x  / Bili: x / Urobili: x   Blood: x / Protein: x / Nitrite: x   Leuk Esterase: x / RBC: x / WBC x   Sq Epi: x / Non Sq Epi: x / Bacteria: x        RADIOLOGY:    < from: CT Abdomen and Pelvis w/ IV Cont (09.29.23 @ 21:40) >  Improved bilateral renal enhancement, suggestive of treatment of   pyelonephritis.    < end of copied text >    < from: US Kidney and Bladder (09.28.23 @ 08:53) >  Mild post void residual.    Focused area of echogenicity corresponding to CT findings consistent with   pyelonephritis.    < end of copied text >    CT Abdomen and Pelvis w/ IV Cont (09.25.23): IMPRESSION: Striated nephrogram of the bilateral kidneys consistent with pyelonephritis. Mild left hydroureter with enhancement of the urothelial wall, consistent with ascending infection.
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
## 2023-09-30 NOTE — BH CONSULTATION LIAISON ASSESSMENT NOTE - SUMMARY
INCOMPLETE NOTE    RECOMMENDATIONS  - Increase lamictal to 125 mg qday  - Start standing Zyprexa 2.5 mg at bedtime to optimize sleep and cognition. 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 Psychiatry consulted for delusions and concern from boyfriend that patient is not acting like herself.     RECOMMENDATIONS  - Increase lamictal to 125 mg qday  - Start standing Zyprexa 2.5 mg at bedtime to optimize sleep and cognition. 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 Psychiatry consulted for delusions and concern from boyfriend that patient is not acting like herself.     Patient's current presentation is consistent with decompensation of known bipolar 2 disorder in setting of medication noncompliance. Patient's lamictal was restarted at 100 mg, she has been on it for past 5 days without any side effects or rash. Ok to increase to 125 mg today as she continues to demonstrate symptoms of hypomania, though insight is relatively retained and circumstantiality may be partially attributable to ADHD. Recommend starting standing Zyprexa 2.5 mg up to 5 mg at bedtime for treatment of insomnia and hypomania.     Patient is not acutely psychotic, manic, or suicidal/homicidal. She is not at acute risk to herself or others and doesn't meet criteria for involuntary inpatient psychiatric admission at this time. Psychiatry will continue to follow.    RECOMMENDATIONS  - Increase lamictal to 125 mg qday  - Start standing Zyprexa 2.5 mg at bedtime to optimize sleep and cognition. Can go up to 5 mg if incomplete response  - ok to continue Wellbutrin, Ambien, and PRN Klonopin; agree w/ holding home Vyvanse for now  - Patient and boyfriend given contact number for [facility] for followup appointment as she no longer has psychiatrist that takes her insurance
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
## 2023-09-30 NOTE — BH CONSULTATION LIAISON ASSESSMENT NOTE - NSBHCONSULTFOLLOWAFTERCARE_PSY_A_CORE FT
Patient may follow up at [facility] after discharge for outpatient psychiatric care. Please provide the following information on discharge paperwork:  [facility]  [address] [place] [address]  Intake Number: [phone]

## 2023-09-30 NOTE — BH CONSULTATION LIAISON ASSESSMENT NOTE - CURRENT MEDICATION
MEDICATIONS  (STANDING):  buPROPion XL (24-Hour) . 150 milliGRAM(s) Oral daily  ertapenem  IVPB 1000 milliGRAM(s) IV Intermittent every 24 hours  lamoTRIgine 100 milliGRAM(s) Oral daily  lidocaine   4% Patch 1 Patch Transdermal daily  methocarbamol 500 milliGRAM(s) Oral two times a day  sodium chloride 0.9%. 1000 milliLiter(s) (100 mL/Hr) IV Continuous <Continuous>    MEDICATIONS  (PRN):  acetaminophen     Tablet .. 650 milliGRAM(s) Oral every 6 hours PRN Temp greater or equal to 38C (100.4F), Mild Pain (1 - 3)  aluminum hydroxide/magnesium hydroxide/simethicone Suspension 30 milliLiter(s) Oral every 4 hours PRN Dyspepsia  clonazePAM  Tablet 0.5 milliGRAM(s) Oral daily PRN anxiety  melatonin 5 milliGRAM(s) Oral at bedtime PRN Insomnia  morphine  - Injectable 2 milliGRAM(s) IV Push every 6 hours PRN Moderate Pain (4 - 6)  ondansetron Injectable 4 milliGRAM(s) IV Push every 8 hours PRN Nausea and/or Vomiting  zolpidem 5 milliGRAM(s) Oral at bedtime PRN Insomnia  zolpidem 5 milliGRAM(s) Oral at bedtime PRN Insomnia

## 2023-10-01 LAB
ALBUMIN SERPL ELPH-MCNC: 4 G/DL — SIGNIFICANT CHANGE UP (ref 3.5–5.2)
ALP SERPL-CCNC: 139 U/L — HIGH (ref 30–115)
ALT FLD-CCNC: 97 U/L — HIGH (ref 0–41)
ANION GAP SERPL CALC-SCNC: 12 MMOL/L — SIGNIFICANT CHANGE UP (ref 7–14)
AST SERPL-CCNC: 36 U/L — SIGNIFICANT CHANGE UP (ref 0–41)
BASOPHILS # BLD AUTO: 0.04 K/UL — SIGNIFICANT CHANGE UP (ref 0–0.2)
BASOPHILS NFR BLD AUTO: 0.6 % — SIGNIFICANT CHANGE UP (ref 0–1)
BILIRUB SERPL-MCNC: <0.2 MG/DL — SIGNIFICANT CHANGE UP (ref 0.2–1.2)
BUN SERPL-MCNC: 10 MG/DL — SIGNIFICANT CHANGE UP (ref 10–20)
CALCIUM SERPL-MCNC: 9.7 MG/DL — SIGNIFICANT CHANGE UP (ref 8.4–10.5)
CHLORIDE SERPL-SCNC: 103 MMOL/L — SIGNIFICANT CHANGE UP (ref 98–110)
CO2 SERPL-SCNC: 26 MMOL/L — SIGNIFICANT CHANGE UP (ref 17–32)
CREAT SERPL-MCNC: 0.6 MG/DL — LOW (ref 0.7–1.5)
CULTURE RESULTS: SIGNIFICANT CHANGE UP
CULTURE RESULTS: SIGNIFICANT CHANGE UP
EGFR: 105 ML/MIN/1.73M2 — SIGNIFICANT CHANGE UP
EOSINOPHIL # BLD AUTO: 0.23 K/UL — SIGNIFICANT CHANGE UP (ref 0–0.7)
EOSINOPHIL NFR BLD AUTO: 3.6 % — SIGNIFICANT CHANGE UP (ref 0–8)
GLUCOSE SERPL-MCNC: 76 MG/DL — SIGNIFICANT CHANGE UP (ref 70–99)
HCT VFR BLD CALC: 39.8 % — SIGNIFICANT CHANGE UP (ref 37–47)
HGB BLD-MCNC: 13.6 G/DL — SIGNIFICANT CHANGE UP (ref 12–16)
IMM GRANULOCYTES NFR BLD AUTO: 0.9 % — HIGH (ref 0.1–0.3)
LYMPHOCYTES # BLD AUTO: 2.29 K/UL — SIGNIFICANT CHANGE UP (ref 1.2–3.4)
LYMPHOCYTES # BLD AUTO: 35.9 % — SIGNIFICANT CHANGE UP (ref 20.5–51.1)
MAGNESIUM SERPL-MCNC: 2.4 MG/DL — SIGNIFICANT CHANGE UP (ref 1.8–2.4)
MCHC RBC-ENTMCNC: 29.8 PG — SIGNIFICANT CHANGE UP (ref 27–31)
MCHC RBC-ENTMCNC: 34.2 G/DL — SIGNIFICANT CHANGE UP (ref 32–37)
MCV RBC AUTO: 87.3 FL — SIGNIFICANT CHANGE UP (ref 81–99)
MONOCYTES # BLD AUTO: 0.6 K/UL — SIGNIFICANT CHANGE UP (ref 0.1–0.6)
MONOCYTES NFR BLD AUTO: 9.4 % — HIGH (ref 1.7–9.3)
NEUTROPHILS # BLD AUTO: 3.15 K/UL — SIGNIFICANT CHANGE UP (ref 1.4–6.5)
NEUTROPHILS NFR BLD AUTO: 49.6 % — SIGNIFICANT CHANGE UP (ref 42.2–75.2)
NRBC # BLD: 0 /100 WBCS — SIGNIFICANT CHANGE UP (ref 0–0)
PLATELET # BLD AUTO: 530 K/UL — HIGH (ref 130–400)
PMV BLD: 11.2 FL — HIGH (ref 7.4–10.4)
POTASSIUM SERPL-MCNC: 5.3 MMOL/L — HIGH (ref 3.5–5)
POTASSIUM SERPL-SCNC: 5.3 MMOL/L — HIGH (ref 3.5–5)
PROT SERPL-MCNC: 6.7 G/DL — SIGNIFICANT CHANGE UP (ref 6–8)
RBC # BLD: 4.56 M/UL — SIGNIFICANT CHANGE UP (ref 4.2–5.4)
RBC # FLD: 12.9 % — SIGNIFICANT CHANGE UP (ref 11.5–14.5)
SODIUM SERPL-SCNC: 141 MMOL/L — SIGNIFICANT CHANGE UP (ref 135–146)
SPECIMEN SOURCE: SIGNIFICANT CHANGE UP
SPECIMEN SOURCE: SIGNIFICANT CHANGE UP
WBC # BLD: 6.37 K/UL — SIGNIFICANT CHANGE UP (ref 4.8–10.8)
WBC # FLD AUTO: 6.37 K/UL — SIGNIFICANT CHANGE UP (ref 4.8–10.8)

## 2023-10-01 PROCEDURE — 99232 SBSQ HOSP IP/OBS MODERATE 35: CPT

## 2023-10-01 RX ORDER — LAMOTRIGINE 25 MG/1
125 TABLET, ORALLY DISINTEGRATING ORAL
Refills: 0 | Status: DISCONTINUED | OUTPATIENT
Start: 2023-10-01 | End: 2023-10-02

## 2023-10-01 RX ADMIN — METHOCARBAMOL 500 MILLIGRAM(S): 500 TABLET, FILM COATED ORAL at 06:14

## 2023-10-01 RX ADMIN — BUPROPION HYDROCHLORIDE 150 MILLIGRAM(S): 150 TABLET, EXTENDED RELEASE ORAL at 11:31

## 2023-10-01 RX ADMIN — ZOLPIDEM TARTRATE 5 MILLIGRAM(S): 10 TABLET ORAL at 22:35

## 2023-10-01 RX ADMIN — LIDOCAINE 1 PATCH: 4 CREAM TOPICAL at 17:04

## 2023-10-01 RX ADMIN — ZOLPIDEM TARTRATE 5 MILLIGRAM(S): 10 TABLET ORAL at 23:35

## 2023-10-01 RX ADMIN — MORPHINE SULFATE 2 MILLIGRAM(S): 50 CAPSULE, EXTENDED RELEASE ORAL at 13:04

## 2023-10-01 RX ADMIN — MORPHINE SULFATE 2 MILLIGRAM(S): 50 CAPSULE, EXTENDED RELEASE ORAL at 13:06

## 2023-10-01 RX ADMIN — ERTAPENEM SODIUM 120 MILLIGRAM(S): 1 INJECTION, POWDER, LYOPHILIZED, FOR SOLUTION INTRAMUSCULAR; INTRAVENOUS at 16:11

## 2023-10-01 RX ADMIN — LIDOCAINE 1 PATCH: 4 CREAM TOPICAL at 11:31

## 2023-10-01 RX ADMIN — LAMOTRIGINE 125 MILLIGRAM(S): 25 TABLET, ORALLY DISINTEGRATING ORAL at 22:27

## 2023-10-01 NOTE — PROGRESS NOTE ADULT - ASSESSMENT
59 y/o female pmhx, PXE, Blindness, presents with c/o lower abdomen pain, radiating across bilateral lower abdomen.  Patient states symptoms started a week ago with Pain, Dysuria, Frequency, malodorous urine, chills and vomiting.  Pt states started taking Tylenol with little improvement.  Pt denies recent UTI treatment.    # Pyelonephritis  # Left mild hydroureter  # Sepsis on admission  - hemodynamically stable   - CT Abdomen and Pelvis w/ IV Cont (09.25.23): striated nephrogram of the bilateral kidneys consistent with pyelonephritis. Mild left hydroureter with enhancement of the urothelial wall, consistent with ascending infection.  - c/w IV cefepime 1 gram q 8 hours  - blood cx: no growth  - UCx: E Coli ESBL - abx switched to ertapenem as per ID   - ID following     # Right flank / groin pain  - renal sono, no stones, pyelonephritis  - pain can be from pyelo vs muscular  - CK level wnl  - c/w Robaxin 500 mg bid  - CT abd/pelvis w cst: noted as above  - c/w current pain regimen    # Hx of Pseudoxanthoma elasticum  - c/w home Klonopin and Lamictal     # Depression / Bipolar disorder  # h/o ADD  # h/o behavioral disorder: on Lamictal at home (as per family, she missed her Lamictal for about 10 weeks before presenting in the hospital)  # family reports that patient having some delusional thoughts  - c/w Wellbutrin  - psych f/up appreciated.  - lamictal increased to 125 mg. d/c zyprexa 2.5 mg q hs      DVT: n/a  GI: n/a  Diet: Regular  Activity: Increase as tolerated  Dispo: Anticipate for tomorrow

## 2023-10-01 NOTE — BH CONSULTATION LIAISON PROGRESS NOTE - NSBHFUPINTERVALHXFT_PSY_A_CORE
Chart reviewed , discussed with staff , patient evaluated by her bed side . initial psych consultation  reviewed . no cute event overnight. Patient reports that she does not like Olanzapine  due to  its side effects " I did  not feel right ,  was more res less last night"  . she reports she has been  taking  lamotrigine , adderall/vyvanse  for her ADHD  for  many years and  been followed by her primary psychiatrist Ayush Askew in [place]  for  more than 10 years . she reports  during   and after COVID   , she has monthly appointment with her psychiatrist on phone and never  seen in person after COVID  and she was prescribed lamotrigine 100 mg po daily ,  Vyvanse  30 mg po daily   ,  Wellbutrin  , clonazepam  as needed.  She reports that she is feeling  better  and in less pain.  She wants to get back to Vyvanse  as she reports  feeling very distracted and not able to focus.   she denies  feeling depress. denies manic  symptoms.  no overt psychosis. She denies suicidal /homicidal ideations intent or plan.   Her BF  reports that  he is looking for another psychiatrist who will take her  Insurance. currently  patient  is paying from her own pocket.

## 2023-10-01 NOTE — BH CONSULTATION LIAISON PROGRESS NOTE - NSBHCONSULTRECOMMENDOTHER_PSY_A_CORE FT
start  her  home medication Vyvanse 30 mg po daily    Discontinue Olanzapine 2.5 mg po at bed time

## 2023-10-01 NOTE — BH CONSULTATION LIAISON PROGRESS NOTE - NSBHCHARTREVIEWVS_PSY_A_CORE FT
Vital Signs Last 24 Hrs  T(C): 36.4 (01 Oct 2023 05:58), Max: 36.5 (30 Sep 2023 22:54)  T(F): 97.5 (01 Oct 2023 05:58), Max: 97.7 (30 Sep 2023 22:54)  HR: 74 (01 Oct 2023 05:58) (74 - 89)  BP: 133/63 (01 Oct 2023 05:58) (133/63 - 165/65)  BP(mean): --  RR: 16 (01 Oct 2023 05:58) (16 - 18)  SpO2: --

## 2023-10-01 NOTE — PROGRESS NOTE ADULT - SUBJECTIVE AND OBJECTIVE BOX
SUBJECTIVE:    Patient is a 58y old Female who presents with a chief complaint of Pyelonephritis (27 Sep 2023 07:19)    Currently admitted to medicine with the primary diagnosis of Pyelonephritis     Today is hospital day 4d. Patient reports that her pain in better controlled today. Denies any new complaints. She is tolerating food well now.      PAST MEDICAL & SURGICAL HISTORY  Pseudoxanthoma elasticum-like papillary dermal elastolysis    H/O blindness    PAD (peripheral artery disease)      SOCIAL HISTORY:  Negative for smoking/alcohol/drug use.     ALLERGIES:  Toradol (Vomiting)      PHYSICAL EXAM:  GEN: No acute distress  LUNGS: Clear to auscultation bilaterally   HEART: S1/S2 present. RRR.   ABD: Soft, mild tenderness over right upper and lower quadrants  EXT: NC/NC/NE/2+PP/EDMONDS  NEURO: AAOX3  SKIN: intact         VITALS:   T(C): 36.4 (01 Oct 2023 14:18), Max: 36.5 (30 Sep 2023 22:54)  T(F): 97.6 (01 Oct 2023 14:18), Max: 97.7 (30 Sep 2023 22:54)  HR: 91 (01 Oct 2023 14:18) (74 - 91)  BP: 136/77 (01 Oct 2023 14:18) (133/63 - 165/65)  RR: 16 (01 Oct 2023 14:18) (16 - 18)  SpO2: 97% (01 Oct 2023 14:18) (97% - 97%)      I&Os:    MEDICATIONS:  STANDING MEDICATIONS  buPROPion XL (24-Hour) . 150 milliGRAM(s) Oral daily  ertapenem  IVPB 1000 milliGRAM(s) IV Intermittent every 24 hours  lamoTRIgine 125 milliGRAM(s) Oral <User Schedule>  OLANZapine 2.5 milliGRAM(s) Oral at bedtime  sodium chloride 0.9%. 1000 milliLiter(s) IV Continuous <Continuous>    PRN MEDICATIONS  acetaminophen     Tablet .. 650 milliGRAM(s) Oral every 6 hours, 09-25-23 @ 19:56 PRN  aluminum hydroxide/magnesium hydroxide/simethicone Suspension 30 milliLiter(s) Oral every 4 hours, 09-25-23 @ 19:56 PRN  clonazePAM  Tablet 0.5 milliGRAM(s) Oral daily, 09-25-23 @ 20:20 PRN  melatonin 5 milliGRAM(s) Oral at bedtime, 09-25-23 @ 19:56 PRN  morphine  - Injectable 2 milliGRAM(s) IV Push every 6 hours, 09-30-23 @ 08:57 PRN  ondansetron Injectable 4 milliGRAM(s) IV Push every 8 hours, 09-25-23 @ 19:56 PRN  zolpidem 5 milliGRAM(s) Oral at bedtime, 09-25-23 @ 20:20 PRN  zolpidem 5 milliGRAM(s) Oral at bedtime, 09-25-23 @ 20:20 PRN    Diet, Regular (09-25-23 @ 20:33) [Active]    LABS:                        13.6   6.37  )-----------( 530      ( 01 Oct 2023 07:17 )             39.8     WBC trend: 6.37 <--, 6.32 <--, 6.59 <--, 6.86 <--  Hgb: 13.6 [10-01-23 @ 07:17]<--, 13.4 [09-30-23 @ 07:38]<--, 12.6 [09-29-23 @ 06:20]<--, 12.0 [09-28-23 @ 06:45]<--, 12.0 [09-27-23 @ 06:21]<--, 12.2 [09-26-23 @ 05:59]<--    10-01    141  |  103  |  10  ----------------------------<  76  5.3<H>   |  26  |  0.6<L>    Ca    9.7      01 Oct 2023 07:17  Mg     2.4     10-01    TPro  6.7  /  Alb  4.0  /  TBili  <0.2  /  DBili  x   /  AST  36  /  ALT  97<H>  /  AlkPhos  139<H>  10-01    Creatinine trend: 0.6<--, 0.7<--, 0.6<--, 0.5<--, 0.5<--, 0.5<--, 0.6<--    SODIUM TREND: Sodium 141 [10-01 @ 07:17]<--, Sodium 140 [09-30 @ 07:38]<--, Sodium 139 [09-29 @ 06:20]<--, Sodium 140 [09-28 @ 06:45]<--, Sodium 141 [09-27 @ 06:21]<--    POC Glucose:             Culture - Blood (collected 09-25-23 @ 16:58)  Source: .Blood Blood-Peripheral  Final Report (10-01-23 @ 01:00):    No growth at 5 days    Culture - Blood (collected 09-25-23 @ 16:58)  Source: .Blood Blood-Peripheral  Final Report (10-01-23 @ 01:00):    No growth at 5 days    Culture - Urine (collected 09-25-23 @ 14:07)  Source: Clean Catch Clean Catch (Midstream)  Final Report (09-28-23 @ 08:16):    >100,000 CFU/ml Escherichia coli ESBL  Organism: Escherichia coli ESBL (09-28-23 @ 08:16)  Organism: Escherichia coli ESBL (09-28-23 @ 08:16)      Urinalysis Basic - ( 01 Oct 2023 07:17 )    Color: x / Appearance: x / SG: x / pH: x  Gluc: 76 mg/dL / Ketone: x  / Bili: x / Urobili: x   Blood: x / Protein: x / Nitrite: x   Leuk Esterase: x / RBC: x / WBC x   Sq Epi: x / Non Sq Epi: x / Bacteria: x            RADIOLOGY:    < from: CT Abdomen and Pelvis w/ IV Cont (09.29.23 @ 21:40) >  Improved bilateral renal enhancement, suggestive of treatment of   pyelonephritis.    < end of copied text >    < from: US Kidney and Bladder (09.28.23 @ 08:53) >  Mild post void residual.    Focused area of echogenicity corresponding to CT findings consistent with   pyelonephritis.    < end of copied text >    CT Abdomen and Pelvis w/ IV Cont (09.25.23): IMPRESSION: Striated nephrogram of the bilateral kidneys consistent with pyelonephritis. Mild left hydroureter with enhancement of the urothelial wall, consistent with ascending infection.

## 2023-10-01 NOTE — PROGRESS NOTE ADULT - PROVIDER SPECIALTY LIST ADULT
Hospitalist
Internal Medicine
Internal Medicine
Infectious Disease
Infectious Disease

## 2023-10-01 NOTE — BH CONSULTATION LIAISON PROGRESS NOTE - NSBHCONSULTFOLLOWAFTERCARE_PSY_A_CORE FT
patient may follow up at [facility] after discharge. please provide the  following information on Discharge paper  [facility]  [address] [facility] [address]  OPD Tel # [phone]

## 2023-10-01 NOTE — BH CONSULTATION LIAISON PROGRESS NOTE - CURRENT MEDICATION
MEDICATIONS  (STANDING):  buPROPion XL (24-Hour) . 150 milliGRAM(s) Oral daily  ertapenem  IVPB 1000 milliGRAM(s) IV Intermittent every 24 hours  lamoTRIgine 125 milliGRAM(s) Oral <User Schedule>  lidocaine   4% Patch 1 Patch Transdermal daily  OLANZapine 2.5 milliGRAM(s) Oral at bedtime  sodium chloride 0.9%. 1000 milliLiter(s) (100 mL/Hr) IV Continuous <Continuous>    MEDICATIONS  (PRN):  acetaminophen     Tablet .. 650 milliGRAM(s) Oral every 6 hours PRN Temp greater or equal to 38C (100.4F), Mild Pain (1 - 3)  aluminum hydroxide/magnesium hydroxide/simethicone Suspension 30 milliLiter(s) Oral every 4 hours PRN Dyspepsia  clonazePAM  Tablet 0.5 milliGRAM(s) Oral daily PRN anxiety  melatonin 5 milliGRAM(s) Oral at bedtime PRN Insomnia  morphine  - Injectable 2 milliGRAM(s) IV Push every 6 hours PRN Moderate Pain (4 - 6)  ondansetron Injectable 4 milliGRAM(s) IV Push every 8 hours PRN Nausea and/or Vomiting  zolpidem 5 milliGRAM(s) Oral at bedtime PRN Insomnia  zolpidem 5 milliGRAM(s) Oral at bedtime PRN Insomnia

## 2023-10-02 ENCOUNTER — TRANSCRIPTION ENCOUNTER (OUTPATIENT)
Age: 58
End: 2023-10-02

## 2023-10-02 VITALS
HEART RATE: 90 BPM | RESPIRATION RATE: 16 BRPM | DIASTOLIC BLOOD PRESSURE: 88 MMHG | SYSTOLIC BLOOD PRESSURE: 148 MMHG | TEMPERATURE: 98 F

## 2023-10-02 LAB
ALBUMIN SERPL ELPH-MCNC: 3.9 G/DL — SIGNIFICANT CHANGE UP (ref 3.5–5.2)
ALP SERPL-CCNC: 133 U/L — HIGH (ref 30–115)
ALT FLD-CCNC: 84 U/L — HIGH (ref 0–41)
ANION GAP SERPL CALC-SCNC: 8 MMOL/L — SIGNIFICANT CHANGE UP (ref 7–14)
AST SERPL-CCNC: 29 U/L — SIGNIFICANT CHANGE UP (ref 0–41)
BASOPHILS # BLD AUTO: 0.05 K/UL — SIGNIFICANT CHANGE UP (ref 0–0.2)
BASOPHILS NFR BLD AUTO: 0.8 % — SIGNIFICANT CHANGE UP (ref 0–1)
BILIRUB SERPL-MCNC: <0.2 MG/DL — SIGNIFICANT CHANGE UP (ref 0.2–1.2)
BUN SERPL-MCNC: 9 MG/DL — LOW (ref 10–20)
CALCIUM SERPL-MCNC: 9.8 MG/DL — SIGNIFICANT CHANGE UP (ref 8.4–10.5)
CHLORIDE SERPL-SCNC: 104 MMOL/L — SIGNIFICANT CHANGE UP (ref 98–110)
CO2 SERPL-SCNC: 27 MMOL/L — SIGNIFICANT CHANGE UP (ref 17–32)
CREAT SERPL-MCNC: 0.7 MG/DL — SIGNIFICANT CHANGE UP (ref 0.7–1.5)
EGFR: 101 ML/MIN/1.73M2 — SIGNIFICANT CHANGE UP
EOSINOPHIL # BLD AUTO: 0.16 K/UL — SIGNIFICANT CHANGE UP (ref 0–0.7)
EOSINOPHIL NFR BLD AUTO: 2.7 % — SIGNIFICANT CHANGE UP (ref 0–8)
GLUCOSE SERPL-MCNC: 86 MG/DL — SIGNIFICANT CHANGE UP (ref 70–99)
HCT VFR BLD CALC: 39 % — SIGNIFICANT CHANGE UP (ref 37–47)
HGB BLD-MCNC: 12.9 G/DL — SIGNIFICANT CHANGE UP (ref 12–16)
IMM GRANULOCYTES NFR BLD AUTO: 0.8 % — HIGH (ref 0.1–0.3)
LYMPHOCYTES # BLD AUTO: 2.12 K/UL — SIGNIFICANT CHANGE UP (ref 1.2–3.4)
LYMPHOCYTES # BLD AUTO: 35.6 % — SIGNIFICANT CHANGE UP (ref 20.5–51.1)
MAGNESIUM SERPL-MCNC: 2.4 MG/DL — SIGNIFICANT CHANGE UP (ref 1.8–2.4)
MCHC RBC-ENTMCNC: 29.7 PG — SIGNIFICANT CHANGE UP (ref 27–31)
MCHC RBC-ENTMCNC: 33.1 G/DL — SIGNIFICANT CHANGE UP (ref 32–37)
MCV RBC AUTO: 89.9 FL — SIGNIFICANT CHANGE UP (ref 81–99)
MONOCYTES # BLD AUTO: 0.46 K/UL — SIGNIFICANT CHANGE UP (ref 0.1–0.6)
MONOCYTES NFR BLD AUTO: 7.7 % — SIGNIFICANT CHANGE UP (ref 1.7–9.3)
NEUTROPHILS # BLD AUTO: 3.11 K/UL — SIGNIFICANT CHANGE UP (ref 1.4–6.5)
NEUTROPHILS NFR BLD AUTO: 52.4 % — SIGNIFICANT CHANGE UP (ref 42.2–75.2)
NRBC # BLD: 0 /100 WBCS — SIGNIFICANT CHANGE UP (ref 0–0)
PLATELET # BLD AUTO: 526 K/UL — HIGH (ref 130–400)
PMV BLD: 10.8 FL — HIGH (ref 7.4–10.4)
POTASSIUM SERPL-MCNC: 5 MMOL/L — SIGNIFICANT CHANGE UP (ref 3.5–5)
POTASSIUM SERPL-SCNC: 5 MMOL/L — SIGNIFICANT CHANGE UP (ref 3.5–5)
PROT SERPL-MCNC: 6.5 G/DL — SIGNIFICANT CHANGE UP (ref 6–8)
RBC # BLD: 4.34 M/UL — SIGNIFICANT CHANGE UP (ref 4.2–5.4)
RBC # FLD: 12.6 % — SIGNIFICANT CHANGE UP (ref 11.5–14.5)
SODIUM SERPL-SCNC: 139 MMOL/L — SIGNIFICANT CHANGE UP (ref 135–146)
WBC # BLD: 5.95 K/UL — SIGNIFICANT CHANGE UP (ref 4.8–10.8)
WBC # FLD AUTO: 5.95 K/UL — SIGNIFICANT CHANGE UP (ref 4.8–10.8)

## 2023-10-02 PROCEDURE — 36569 INSJ PICC 5 YR+ W/O IMAGING: CPT

## 2023-10-02 PROCEDURE — 99239 HOSP IP/OBS DSCHRG MGMT >30: CPT

## 2023-10-02 RX ORDER — ACETAMINOPHEN 500 MG
2 TABLET ORAL
Qty: 0 | Refills: 0 | DISCHARGE
Start: 2023-10-02

## 2023-10-02 RX ORDER — LISDEXAMFETAMINE DIMESYLATE 70 MG/1
0 CAPSULE ORAL
Qty: 0 | Refills: 0 | DISCHARGE

## 2023-10-02 RX ORDER — ERTAPENEM SODIUM 1 G/1
1 INJECTION, POWDER, LYOPHILIZED, FOR SOLUTION INTRAMUSCULAR; INTRAVENOUS
Qty: 0 | Refills: 0 | DISCHARGE
Start: 2023-10-02 | End: 2023-10-04

## 2023-10-02 RX ORDER — LAMOTRIGINE 25 MG/1
1 TABLET, ORALLY DISINTEGRATING ORAL
Qty: 30 | Refills: 0
Start: 2023-10-02 | End: 2023-10-31

## 2023-10-02 RX ADMIN — ERTAPENEM SODIUM 120 MILLIGRAM(S): 1 INJECTION, POWDER, LYOPHILIZED, FOR SOLUTION INTRAMUSCULAR; INTRAVENOUS at 13:54

## 2023-10-02 RX ADMIN — BUPROPION HYDROCHLORIDE 150 MILLIGRAM(S): 150 TABLET, EXTENDED RELEASE ORAL at 13:55

## 2023-10-02 NOTE — DISCHARGE NOTE PROVIDER - PROVIDER TOKENS
PROVIDER:[TOKEN:[[id number]],FOLLOWUP:[1-3 days],ESTABLISHEDPATIENT:[T]] PROVIDER:[TOKEN:[[id number]],FOLLOWUP:[1-3 days],ESTABLISHEDPATIENT:[T]],FREE:[LAST:[[facility]],PHONE:[phone]],FAX:[(   )    -],ADDRESS:[[facility] [address] [place] [address]]]

## 2023-10-02 NOTE — DISCHARGE NOTE PROVIDER - NSDCFUADDAPPT_GEN_ALL_CORE_FT
Please call [facility] OPD clinic within 1 week to schedule appointment  Contact info:  [facility]PD clinic  [address] [place] [address]  OPD Tel # [phone]

## 2023-10-02 NOTE — DISCHARGE NOTE NURSING/CASE MANAGEMENT/SOCIAL WORK - NSDCPEFALRISK_GEN_ALL_CORE
For information on Fall & Injury Prevention, visit: [email or web address] OR  [email or web address] OR  https://www.cdc.gov/steadi/patient.html

## 2023-10-02 NOTE — DISCHARGE NOTE PROVIDER - CARE PROVIDER_API CALL
Bry Linares  Internal Medicine  [address] [place] [address]  Phone: [phone]  Fax: [phone]  Established Patient  Follow Up Time: 1-3 days   Bry Linares  Internal Medicine  [address] [place] [address]  Phone: [phone]  Fax: [phone]  Established Patient  Follow Up Time: 1-3 days    [facility],   [address]   [facility] [address]  Phone: [phone]  Fax: (   )    -  Follow Up Time:

## 2023-10-02 NOTE — CHART NOTE - NSCHARTNOTEFT_GEN_A_CORE
Patient examined on day of discharge and found to be medically stable for discharge by Dr. Ryan. This patient will require a hospital bed for discharge to home in order to keep head elevated greater than 30 degrees due to multiple medical conditions including pulmonary embolism   Pt requires frequent position changes requiring assistance as independent position changes not feasible.    Pillows and wedges have been tried and failed.  Patient is at risk for skin breakdown. Pt has limited mobility and not able to independently change position sufficiently in order to alleviate pressure    She will require gel overlay to prevent skin breakdown received call from LT[facility], located within [facility], with  report for Ertapenem IV abx via midline order relayed accordingly.   1st dose to be administered inpatient prior to DC

## 2023-10-02 NOTE — DISCHARGE NOTE NURSING/CASE MANAGEMENT/SOCIAL WORK - NSDCFUADDAPPT_GEN_ALL_CORE_FT
Please call [facility]PD clinic within 1 week to schedule appointment  Contact info:  [facility]PD clinic  [address] [place] [address]  OPD Tel # [phone]

## 2023-10-02 NOTE — DISCHARGE NOTE PROVIDER - NSDCCPCAREPLAN_GEN_ALL_CORE_FT
PRINCIPAL DISCHARGE DIAGNOSIS  Diagnosis: Pyelonephritis  Assessment and Plan of Treatment: You have a  kidney infection called pyelonephritis. The infection can damage your kidneys and cause bacteria to enter your bloodstream. You were treated in the hospital with IV antibiotics and your symptoms improved.  Take all the medicine you were prescribed, even if you feel better. Not finishing the medicine can make the infection come back. It may also make a future infection harder to treat. Make sure to drink 8 to 12 glasses of fluid every day. Clear fluids, such as water, are best. To prevent future infection, always wipe the genital area from front to back and urinate frequently. Avoid holding urine in the bladder for a long time. Always urinate after sexual intercourse.  Seek medical attention immediately if you have any of the following:  Decreased urine output or trouble urinating  Severe pain in the lower back or flank  Fever of 100.4°F (38°C) or higher, or as directed by your healthcare provider  Shaking chills  Vomiting  Blood in your urine  Dark-colored or foul-smelling urine  Nausea or other problems that prevent you from taking your prescribed medicine     PRINCIPAL DISCHARGE DIAGNOSIS  Diagnosis: Pyelonephritis  Assessment and Plan of Treatment: You have a  kidney infection called pyelonephritis. The infection can damage your kidneys and cause bacteria to enter your bloodstream. You were treated in the hospital with IV antibiotics and your symptoms improved.  Take all the medicine you were prescribed, even if you feel better. Not finishing the medicine can make the infection come back. It may also make a future infection harder to treat. Make sure to drink 8 to 12 glasses of fluid every day. Clear fluids, such as water, are best. To prevent future infection, always wipe the genital area from front to back and urinate frequently. Avoid holding urine in the bladder for a long time. Always urinate after sexual intercourse.  Seek medical attention immediately if you have any of the following:  Decreased urine output or trouble urinating  Severe pain in the lower back or flank  Fever of 100.4°F (38°C) or higher, or as directed by your healthcare provider  Shaking chills  Vomiting  Blood in your urine  Dark-colored or foul-smelling urine  Nausea or other problems that prevent you from taking your prescribed medicine  You are being d/mj home on IV antibiotic.  Please take your medications as prescribed and follow up with your PMD in 1-2 weeks after discharge.      SECONDARY DISCHARGE DIAGNOSES  Diagnosis: Bipolar disorder  Assessment and Plan of Treatment: You were seen by psych team in the hospital with recommendations to increase dose of Lamictal to 125 mg daily plus resume home dose of Vyvanse.  Please take your medications as prescribed and follow up with your psychiatrist in 1-2 weeks after discharge.

## 2023-10-02 NOTE — DISCHARGE NOTE PROVIDER - CARE PROVIDERS DIRECT ADDRESSES
[email or web address]

## 2023-10-02 NOTE — DISCHARGE NOTE PROVIDER - HOSPITAL COURSE
FROM ADMISSION H+P:   HPI:  59 y/o female pmhx, PXE, Blindness, presents with c/o lower abdomen pain, radiating across bilateral lower abdomen.  Pt states symptoms started a week ago with Pain, Dysuria, Frequency, malodorous urine, chills and vomiting.  Pt states started taking Tylenol with little improvement.  Pt denies recent UTI treatment.    Home meds obtained from pt.   (25 Sep 2023 19:51)      ---  HOSPITAL COURSE:     Patient was medically optimized and improved clinically throughout hospital course.      Patient examined on day of discharge and found to be medically stable for discharge by             with outpatient follow up.                    Vital Signs Last 24 Hrs  T(C): 35.8 (02 Oct 2023 05:00), Max: 36.4 (01 Oct 2023 14:18)  T(F): 96.4 (02 Oct 2023 05:00), Max: 97.6 (01 Oct 2023 14:18)  HR: 65 (02 Oct 2023 05:00) (65 - 91)  BP: 119/58 (02 Oct 2023 05:00) (119/58 - 136/77)  BP(mean): --  RR: 16 (02 Oct 2023 05:00) (16 - 16)  SpO2: 97% (01 Oct 2023 14:18) (97% - 97%)         FROM ADMISSION H+P:   HPI:  59 y/o female pmhx, PXE, Blindness, presents with c/o lower abdomen pain, radiating across bilateral lower abdomen.  Pt states symptoms started a week ago with Pain, Dysuria, Frequency, malodorous urine, chills and vomiting.  Pt states started taking Tylenol with little improvement.  Pt denies recent UTI treatment.    Home meds obtained from pt.   (25 Sep 2023 19:51)      ---  HOSPITAL COURSE:   # Pyelonephritis  # Left mild hydroureter  # Sepsis on admission  - hemodynamically stable   - CT Abdomen and Pelvis w/ IV Cont (09.25.23): striated nephrogram of the bilateral kidneys consistent with pyelonephritis. Mild left hydroureter with enhancement of the urothelial wall, consistent with ascending infection.  - blood cx: no growth  - UCx: E Coli ESBL; ID consulted/w IV cefepime continued from admission and then transitioned ertapenem on 9/28 for total course of 7 days per ID    # Right flank / groin pain  - renal sono, no stones, pyelonephritis  - pain can be from pyelo vs muscular  - CK level wnl  - c/w Robaxin 500 mg bid  - c/w current pain regimen    # Hx of Pseudoxanthoma elasticum  - c/w home Klonopin and Lamictal     # Depression / Bipolar disorder  # h/o ADD  # h/o behavioral disorder: on Lamictal at home (as per family, she missed her Lamictal for about 10 weeks before presenting in the hospital)  # family reports that patient having some delusional thoughts  - c/w Wellbutrin  - psych f/up appreciated.  - lamictal increased to 125 mg.   d/c zyprexa 2.5 mg q hs per       Patient was medically optimized and improved clinically throughout hospital course by Dr. Ryan    with outpatient follow up to be DC'ed w/ midline for continuation of IV abx                     Vital Signs Last 24 Hrs  T(C): 35.8 (02 Oct 2023 05:00), Max: 36.4 (01 Oct 2023 14:18)  T(F): 96.4 (02 Oct 2023 05:00), Max: 97.6 (01 Oct 2023 14:18)  HR: 65 (02 Oct 2023 05:00) (65 - 91)  BP: 119/58 (02 Oct 2023 05:00) (119/58 - 136/77)  BP(mean): --  RR: 16 (02 Oct 2023 05:00) (16 - 16)  SpO2: 97% (01 Oct 2023 14:18) (97% - 97%)

## 2023-10-02 NOTE — DISCHARGE NOTE PROVIDER - NSDCMRMEDTOKEN_GEN_ALL_CORE_FT
Ambien 10 mg oral tablet: 1 tab(s) orally once a day (at bedtime) as needed for  insomnia  KlonoPIN 0.5 mg oral tablet: 1 tab(s) orally once a day  LaMICtal 100 mg oral tablet: 1 tab(s) orally once a day  Wellbutrin  mg/24 hours oral tablet, extended release: 1 tab(s) orally once a day   acetaminophen 325 mg oral tablet: 2 tab(s) orally every 6 hours As needed Temp greater or equal to 38C (100.4F), Mild Pain (1 - 3)  Ambien 10 mg oral tablet: 1 tab(s) orally once a day (at bedtime) as needed for  insomnia  ertapenem 1 g injection: 1 gram(s) injectable every 24 hours  KlonoPIN 0.5 mg oral tablet: 1 tab(s) orally once a day  LaMICtal 100 mg oral tablet: 1 tab(s) orally once a day  LaMICtal 25 mg oral tablet: 1 tab(s) orally once a day x 30 days  VYVANSE 30 MG CAPSULE: TAKE 1 CAPSULE BY MOUTH ONCE A DAY WITH A GLASS OF WATER  Wellbutrin  mg/24 hours oral tablet, extended release: 1 tab(s) orally once a day

## 2023-10-02 NOTE — DISCHARGE NOTE PROVIDER - ATTENDING DISCHARGE PHYSICAL EXAMINATION:
VITALS:  T(F): 96.4 (10-02-23 @ 05:00), Max: 96.4 (10-02-23 @ 05:00)  HR: 65 (10-02-23 @ 05:00) (65 - 75)  BP: 119/58 (10-02-23 @ 05:00) (119/58 - 125/62)  RR: 16 (10-02-23 @ 05:00) (16 - 16)  SpO2: --      PHYSICAL EXAM:  GEN: No acute distress  HEAD: atraumatic, normocephalic  EYES: pink conjunctiva  ENT: moist mucus membranes  LUNGS: Clear to auscultation bilaterally   HEART: S1/S2 present. RRR.   ABD: Soft, non-tender  EXT: NC/NC/NE/2+PP/EDMONDS  NEURO: AAOX3  SKIN: intact  VITALS:  T(F): 96.4 (10-02-23 @ 05:00), Max: 96.4 (10-02-23 @ 05:00)  HR: 65 (10-02-23 @ 05:00) (65 - 75)  BP: 119/58 (10-02-23 @ 05:00) (119/58 - 125/62)  RR: 16 (10-02-23 @ 05:00) (16 - 16)        PHYSICAL EXAM:  GEN: No acute distress  HEAD: atraumatic, normocephalic  EYES: pink conjunctiva  ENT: moist mucus membranes  LUNGS: Clear to auscultation bilaterally   HEART: S1/S2 present. RRR.   ABD: Soft, non-tender  EXT: NC/NC/NE/2+PP/EDMONDS  NEURO: AAOX3  SKIN: intact

## 2023-10-02 NOTE — DISCHARGE NOTE NURSING/CASE MANAGEMENT/SOCIAL WORK - PATIENT PORTAL LINK FT
You can access the FollowMyHealth Patient Portal offered by [facility] by registering at the following website: [email or web address]. By joining [employer]’s FollowMyHealth portal, you will also be able to view your health information using other applications (apps) compatible with our system.

## 2023-10-05 ENCOUNTER — APPOINTMENT (OUTPATIENT)
Dept: PSYCHIATRY | Facility: CLINIC | Age: 58
End: 2023-10-05

## 2023-10-05 PROBLEM — Z86.69 PERSONAL HISTORY OF OTHER DISEASES OF THE NERVOUS SYSTEM AND SENSE ORGANS: Chronic | Status: ACTIVE | Noted: 2023-09-25

## 2023-10-05 PROBLEM — L98.8 OTHER SPECIFIED DISORDERS OF THE SKIN AND SUBCUTANEOUS TISSUE: Chronic | Status: ACTIVE | Noted: 2023-09-25

## 2023-10-05 PROBLEM — I73.9 PERIPHERAL VASCULAR DISEASE, UNSPECIFIED: Chronic | Status: ACTIVE | Noted: 2023-09-25

## 2023-10-06 DIAGNOSIS — Z88.6 ALLERGY STATUS TO ANALGESIC AGENT: ICD-10-CM

## 2023-10-06 DIAGNOSIS — F90.9 ATTENTION-DEFICIT HYPERACTIVITY DISORDER, UNSPECIFIED TYPE: ICD-10-CM

## 2023-10-06 DIAGNOSIS — N13.6 PYONEPHROSIS: ICD-10-CM

## 2023-10-06 DIAGNOSIS — E87.1 HYPO-OSMOLALITY AND HYPONATREMIA: ICD-10-CM

## 2023-10-06 DIAGNOSIS — R74.01 ELEVATION OF LEVELS OF LIVER TRANSAMINASE LEVELS: ICD-10-CM

## 2023-10-06 DIAGNOSIS — F31.81 BIPOLAR II DISORDER: ICD-10-CM

## 2023-10-06 DIAGNOSIS — B96.20 UNSPECIFIED ESCHERICHIA COLI [E. COLI] AS THE CAUSE OF DISEASES CLASSIFIED ELSEWHERE: ICD-10-CM

## 2023-10-06 DIAGNOSIS — I73.9 PERIPHERAL VASCULAR DISEASE, UNSPECIFIED: ICD-10-CM

## 2023-10-06 DIAGNOSIS — A41.9 SEPSIS, UNSPECIFIED ORGANISM: ICD-10-CM

## 2023-10-06 DIAGNOSIS — F98.8 OTHER SPECIFIED BEHAVIORAL AND EMOTIONAL DISORDERS WITH ONSET USUALLY OCCURRING IN CHILDHOOD AND ADOLESCENCE: ICD-10-CM

## 2023-10-06 DIAGNOSIS — Q82.8 OTHER SPECIFIED CONGENITAL MALFORMATIONS OF SKIN: ICD-10-CM

## 2023-10-06 DIAGNOSIS — E87.6 HYPOKALEMIA: ICD-10-CM

## 2023-10-06 DIAGNOSIS — H54.40 BLINDNESS, ONE EYE, UNSPECIFIED EYE: ICD-10-CM

## 2023-10-06 DIAGNOSIS — F91.9 CONDUCT DISORDER, UNSPECIFIED: ICD-10-CM

## 2023-12-31 ENCOUNTER — EMERGENCY (EMERGENCY)
Facility: HOSPITAL | Age: 58
LOS: 0 days | Discharge: HOME | End: 2023-12-31
Attending: EMERGENCY MEDICINE
Payer: MEDICAID

## 2023-12-31 VITALS
HEART RATE: 76 BPM | RESPIRATION RATE: 20 BRPM | TEMPERATURE: 98 F | DIASTOLIC BLOOD PRESSURE: 62 MMHG | WEIGHT: 154.98 LBS | OXYGEN SATURATION: 99 % | SYSTOLIC BLOOD PRESSURE: 133 MMHG

## 2023-12-31 DIAGNOSIS — Z88.6 ALLERGY STATUS TO ANALGESIC AGENT: ICD-10-CM

## 2023-12-31 DIAGNOSIS — M54.9 DORSALGIA, UNSPECIFIED: ICD-10-CM

## 2023-12-31 DIAGNOSIS — R35.0 FREQUENCY OF MICTURITION: ICD-10-CM

## 2023-12-31 DIAGNOSIS — M62.830 MUSCLE SPASM OF BACK: ICD-10-CM

## 2023-12-31 DIAGNOSIS — Z87.448 PERSONAL HISTORY OF OTHER DISEASES OF URINARY SYSTEM: ICD-10-CM

## 2023-12-31 DIAGNOSIS — Z87.442 PERSONAL HISTORY OF URINARY CALCULI: ICD-10-CM

## 2023-12-31 LAB
APPEARANCE UR: ABNORMAL
APPEARANCE UR: ABNORMAL
BILIRUB UR-MCNC: NEGATIVE — SIGNIFICANT CHANGE UP
BILIRUB UR-MCNC: NEGATIVE — SIGNIFICANT CHANGE UP
COLOR SPEC: YELLOW — SIGNIFICANT CHANGE UP
COLOR SPEC: YELLOW — SIGNIFICANT CHANGE UP
DIFF PNL FLD: NEGATIVE — SIGNIFICANT CHANGE UP
DIFF PNL FLD: NEGATIVE — SIGNIFICANT CHANGE UP
GLUCOSE UR QL: NEGATIVE MG/DL — SIGNIFICANT CHANGE UP
GLUCOSE UR QL: NEGATIVE MG/DL — SIGNIFICANT CHANGE UP
KETONES UR-MCNC: NEGATIVE MG/DL — SIGNIFICANT CHANGE UP
KETONES UR-MCNC: NEGATIVE MG/DL — SIGNIFICANT CHANGE UP
LEUKOCYTE ESTERASE UR-ACNC: NEGATIVE — SIGNIFICANT CHANGE UP
LEUKOCYTE ESTERASE UR-ACNC: NEGATIVE — SIGNIFICANT CHANGE UP
NITRITE UR-MCNC: NEGATIVE — SIGNIFICANT CHANGE UP
NITRITE UR-MCNC: NEGATIVE — SIGNIFICANT CHANGE UP
PH UR: 5.5 — SIGNIFICANT CHANGE UP (ref 5–8)
PH UR: 5.5 — SIGNIFICANT CHANGE UP (ref 5–8)
PROT UR-MCNC: NEGATIVE MG/DL — SIGNIFICANT CHANGE UP
PROT UR-MCNC: NEGATIVE MG/DL — SIGNIFICANT CHANGE UP
SP GR SPEC: 1.03 — SIGNIFICANT CHANGE UP (ref 1–1.03)
SP GR SPEC: 1.03 — SIGNIFICANT CHANGE UP (ref 1–1.03)
UROBILINOGEN FLD QL: 0.2 MG/DL — SIGNIFICANT CHANGE UP (ref 0.2–1)
UROBILINOGEN FLD QL: 0.2 MG/DL — SIGNIFICANT CHANGE UP (ref 0.2–1)

## 2023-12-31 PROCEDURE — 99284 EMERGENCY DEPT VISIT MOD MDM: CPT

## 2023-12-31 PROCEDURE — 96372 THER/PROPH/DIAG INJ SC/IM: CPT

## 2023-12-31 PROCEDURE — 87086 URINE CULTURE/COLONY COUNT: CPT

## 2023-12-31 PROCEDURE — 99283 EMERGENCY DEPT VISIT LOW MDM: CPT | Mod: 25

## 2023-12-31 RX ORDER — KETOROLAC TROMETHAMINE 30 MG/ML
30 SYRINGE (ML) INJECTION ONCE
Refills: 0 | Status: DISCONTINUED | OUTPATIENT
Start: 2023-12-31 | End: 2023-12-31

## 2023-12-31 RX ORDER — TIZANIDINE 4 MG/1
1 TABLET ORAL
Qty: 30 | Refills: 0
Start: 2023-12-31 | End: 2024-01-14

## 2023-12-31 RX ORDER — ONDANSETRON 8 MG/1
8 TABLET, FILM COATED ORAL ONCE
Refills: 0 | Status: COMPLETED | OUTPATIENT
Start: 2023-12-31 | End: 2023-12-31

## 2023-12-31 RX ADMIN — Medication 30 MILLIGRAM(S): at 10:20

## 2023-12-31 RX ADMIN — ONDANSETRON 8 MILLIGRAM(S): 8 TABLET, FILM COATED ORAL at 10:21

## 2023-12-31 NOTE — ED PROVIDER NOTE - PATIENT PORTAL LINK FT
You can access the FollowMyHealth Patient Portal offered by [facility] by registering at the following website: [email or web address]. By joining [employer]’s FollowMyHealth portal, you will also be able to view your health information using other applications (apps) compatible with our system. You can access the FollowMyHealth Patient Portal offered by [facility] by registering at the following website: [email or web address]. By joining [employer]’s FollowMyHealth portal, you will also be able to view your health information using other applications (apps) compatible with our system.

## 2023-12-31 NOTE — ED PROVIDER NOTE - ATTENDING APP SHARED VISIT CONTRIBUTION OF CARE
Patient is a 58-year-old female with history of pyelonephritis coming in with 3 days of right lower back pain that worsens with movement.  She called her PMD and who advised her to come to ED to check for infection.  Patient denies any dysuria or fever.  No nausea.    Exam: Nontender spine, no rash, no CVA tenderness, soft nontender abdomen, no acute distress, palpable paralumbar spasm  Plan: Muscle relaxants, urinalysis

## 2023-12-31 NOTE — ED PROVIDER NOTE - CLINICAL SUMMARY MEDICAL DECISION MAKING FREE TEXT BOX
No wheezing/clear to mild end expiratory wheeze or scattered expiratory wheeze
muscle spasm - dc home with relaxants

## 2023-12-31 NOTE — ED PROVIDER NOTE - OBJECTIVE STATEMENT
Patient is a 58-year-old female history of connective tissue disorder, kidney stones, pyelonephritis here for evaluation of right-sided back pain for 3 days worsening with movement with associated urinary frequency.  Patient denies hematuria, dysuria, fever, chills, chest pain, shortness of breath, abdominal pain

## 2023-12-31 NOTE — ED ADULT NURSE NOTE - NSFALLUNIVINTERV_ED_ALL_ED
Bed/Stretcher in lowest position, wheels locked, appropriate side rails in place/Call bell, personal items and telephone in reach/Instruct patient to call for assistance before getting out of bed/chair/stretcher/Non-slip footwear applied when patient is off stretcher/[place] to call system/Physically safe environment - no spills, clutter or unnecessary equipment/Purposeful proactive rounding/Room/bathroom lighting operational, light cord in reach Bed/Stretcher in lowest position, wheels locked, appropriate side rails in place/Call bell, personal items and telephone in reach/Instruct patient to call for assistance before getting out of bed/chair/stretcher/Non-slip footwear applied when patient is off stretcher/[place] to call system/Physically safe environment - no spills, clutter or unnecessary equipment/Purposeful proactive rounding/Room/bathroom lighting operational, light cord in reach

## 2024-01-01 LAB
CULTURE RESULTS: SIGNIFICANT CHANGE UP
CULTURE RESULTS: SIGNIFICANT CHANGE UP
SPECIMEN SOURCE: SIGNIFICANT CHANGE UP
SPECIMEN SOURCE: SIGNIFICANT CHANGE UP

## 2024-01-04 ENCOUNTER — APPOINTMENT (OUTPATIENT)
Dept: OBGYN | Facility: CLINIC | Age: 59
End: 2024-01-04
Payer: MEDICAID

## 2024-01-04 VITALS
HEIGHT: 65 IN | DIASTOLIC BLOOD PRESSURE: 66 MMHG | WEIGHT: 130 LBS | HEART RATE: 85 BPM | SYSTOLIC BLOOD PRESSURE: 104 MMHG | BODY MASS INDEX: 21.66 KG/M2

## 2024-01-04 DIAGNOSIS — Z01.419 ENCOUNTER FOR GYNECOLOGICAL EXAMINATION (GENERAL) (ROUTINE) W/OUT ABNORMAL FINDINGS: ICD-10-CM

## 2024-01-04 DIAGNOSIS — Z87.891 PERSONAL HISTORY OF NICOTINE DEPENDENCE: ICD-10-CM

## 2024-01-04 DIAGNOSIS — N95.1 MENOPAUSAL AND FEMALE CLIMACTERIC STATES: ICD-10-CM

## 2024-01-04 DIAGNOSIS — R92.30 DENSE BREASTS, UNSPECIFIED: ICD-10-CM

## 2024-01-04 DIAGNOSIS — F32.A DEPRESSION, UNSPECIFIED: ICD-10-CM

## 2024-01-04 DIAGNOSIS — F32.81 PREMENSTRUAL DYSPHORIC DISORDER: ICD-10-CM

## 2024-01-04 DIAGNOSIS — Z51.81 ENCOUNTER FOR THERAPEUTIC DRUG LVL MONITORING: ICD-10-CM

## 2024-01-04 DIAGNOSIS — Z79.890 ENCOUNTER FOR THERAPEUTIC DRUG LVL MONITORING: ICD-10-CM

## 2024-01-04 DIAGNOSIS — Z12.39 ENCOUNTER FOR OTHER SCREENING FOR MALIGNANT NEOPLASM OF BREAST: ICD-10-CM

## 2024-01-04 DIAGNOSIS — Z87.42 PERSONAL HISTORY OF OTHER DISEASES OF THE FEMALE GENITAL TRACT: ICD-10-CM

## 2024-01-04 DIAGNOSIS — Q82.8 OTHER SPECIFIED CONGENITAL MALFORMATIONS OF SKIN: ICD-10-CM

## 2024-01-04 DIAGNOSIS — J45.909 UNSPECIFIED ASTHMA, UNCOMPLICATED: ICD-10-CM

## 2024-01-04 DIAGNOSIS — Z78.9 OTHER SPECIFIED HEALTH STATUS: ICD-10-CM

## 2024-01-04 DIAGNOSIS — F31.9 BIPOLAR DISORDER, UNSPECIFIED: ICD-10-CM

## 2024-01-04 LAB
BILIRUB UR QL STRIP: NEGATIVE
CLARITY UR: CLEAR
COLLECTION METHOD: NORMAL
GLUCOSE UR-MCNC: NEGATIVE
HCG UR QL: 0.2 EU/DL
HGB UR QL STRIP.AUTO: NEGATIVE
KETONES UR-MCNC: NORMAL
LEUKOCYTE ESTERASE UR QL STRIP: NEGATIVE
NITRITE UR QL STRIP: NEGATIVE
PH UR STRIP: 5.5
PROT UR STRIP-MCNC: NEGATIVE
SP GR UR STRIP: 1.02

## 2024-01-04 PROCEDURE — 81003 URINALYSIS AUTO W/O SCOPE: CPT | Mod: QW

## 2024-01-04 PROCEDURE — 99386 PREV VISIT NEW AGE 40-64: CPT

## 2024-01-04 RX ORDER — ESTRADIOL 2 MG/1
2 TABLET ORAL DAILY
Qty: 90 | Refills: 1 | Status: ACTIVE | COMMUNITY
Start: 2024-01-04 | End: 1900-01-01

## 2024-01-04 RX ORDER — ZOLPIDEM TARTRATE 5 MG/1
TABLET, FILM COATED ORAL
Refills: 0 | Status: ACTIVE | COMMUNITY

## 2024-01-04 RX ORDER — BUPROPION HYDROCHLORIDE 100 MG/1
TABLET, FILM COATED ORAL
Refills: 0 | Status: ACTIVE | COMMUNITY

## 2024-01-04 RX ORDER — LISDEXAMFETAMINE DIMESYLATE 30 MG/1
30 CAPSULE ORAL
Refills: 0 | Status: ACTIVE | COMMUNITY

## 2024-01-04 RX ORDER — LAMOTRIGINE 150 MG/1
TABLET ORAL
Refills: 0 | Status: ACTIVE | COMMUNITY

## 2024-01-04 NOTE — DISCUSSION/SUMMARY
[FreeTextEntry1] : discussed reducing to 1mg daily pt instructed to remain on medication, do not stop for 10 days/month pt states she does well on current dose and would like to continue needs mammogram , last mammogram was in 2021

## 2024-01-04 NOTE — HISTORY OF PRESENT ILLNESS
[postmenopausal] : postmenopausal [Y] : Positive pregnancy history [PGHxTotal] : 2 [[facility]] : 2 [FreeTextEntry1] : 2 c/s  Dr Fajardo [Hot Flashes] : hot flashes [Night Sweats] : night sweats [Yes] : yes [TextBox_6] : only when not taking estradiol x 10 days [Currently In Menopause] : currently in menopause [No] : Patient does not have concerns regarding sex [Currently Active] : currently active [Men] : men

## 2024-01-09 LAB — CYTOLOGY CVX/VAG DOC THIN PREP: NORMAL

## 2024-10-08 ENCOUNTER — RX RENEWAL (OUTPATIENT)
Age: 59
End: 2024-10-08

## 2025-01-09 ENCOUNTER — APPOINTMENT (OUTPATIENT)
Dept: OBGYN | Facility: CLINIC | Age: 60
End: 2025-01-09

## 2025-01-20 ENCOUNTER — RX RENEWAL (OUTPATIENT)
Age: 60
End: 2025-01-20